# Patient Record
Sex: FEMALE | Race: BLACK OR AFRICAN AMERICAN | NOT HISPANIC OR LATINO | Employment: UNEMPLOYED | RURAL
[De-identification: names, ages, dates, MRNs, and addresses within clinical notes are randomized per-mention and may not be internally consistent; named-entity substitution may affect disease eponyms.]

---

## 2020-04-27 ENCOUNTER — HISTORICAL (OUTPATIENT)
Dept: ADMINISTRATIVE | Facility: HOSPITAL | Age: 40
End: 2020-04-27

## 2021-09-26 ENCOUNTER — HOSPITAL ENCOUNTER (EMERGENCY)
Facility: HOSPITAL | Age: 41
Discharge: HOME | End: 2021-09-26
Attending: FAMILY MEDICINE
Payer: MEDICAID

## 2021-09-26 VITALS
TEMPERATURE: 99 F | SYSTOLIC BLOOD PRESSURE: 117 MMHG | HEART RATE: 90 BPM | RESPIRATION RATE: 20 BRPM | BODY MASS INDEX: 30.13 KG/M2 | DIASTOLIC BLOOD PRESSURE: 71 MMHG | OXYGEN SATURATION: 99 % | HEIGHT: 67 IN | WEIGHT: 192 LBS

## 2021-09-26 DIAGNOSIS — M94.0 ACUTE COSTOCHONDRITIS: Primary | ICD-10-CM

## 2021-09-26 DIAGNOSIS — R07.9 CHEST PAIN: ICD-10-CM

## 2021-09-26 LAB
ALBUMIN SERPL BCP-MCNC: 3.1 G/DL (ref 3.5–5)
ALBUMIN/GLOB SERPL: 0.8 {RATIO}
ALP SERPL-CCNC: 111 U/L (ref 37–98)
ALT SERPL W P-5'-P-CCNC: 24 U/L (ref 13–56)
ANION GAP SERPL CALCULATED.3IONS-SCNC: 10 MMOL/L (ref 7–16)
APTT PPP: 31.2 SECONDS (ref 25.2–37.3)
AST SERPL W P-5'-P-CCNC: 20 U/L (ref 15–37)
BASOPHILS # BLD AUTO: 0.02 K/UL (ref 0–0.2)
BASOPHILS NFR BLD AUTO: 0.3 % (ref 0–1)
BILIRUB SERPL-MCNC: 0.3 MG/DL (ref 0–1.2)
BILIRUB UR QL STRIP: NEGATIVE
BUN SERPL-MCNC: 14 MG/DL (ref 7–18)
BUN/CREAT SERPL: 14 (ref 6–20)
CALCIUM SERPL-MCNC: 8.4 MG/DL (ref 8.5–10.1)
CHLORIDE SERPL-SCNC: 106 MMOL/L (ref 98–107)
CLARITY UR: CLEAR
CO2 SERPL-SCNC: 29 MMOL/L (ref 21–32)
COLOR UR: YELLOW
CREAT SERPL-MCNC: 1.01 MG/DL (ref 0.55–1.02)
DIFFERENTIAL METHOD BLD: ABNORMAL
EOSINOPHIL # BLD AUTO: 0.24 K/UL (ref 0–0.5)
EOSINOPHIL NFR BLD AUTO: 3.1 % (ref 1–4)
ERYTHROCYTE [DISTWIDTH] IN BLOOD BY AUTOMATED COUNT: 15 % (ref 11.5–14.5)
GLOBULIN SER-MCNC: 3.9 G/DL (ref 2–4)
GLUCOSE SERPL-MCNC: 101 MG/DL (ref 74–106)
GLUCOSE UR STRIP-MCNC: NEGATIVE MG/DL
HCG UR QL IA.RAPID: NEGATIVE
HCT VFR BLD AUTO: 38.7 % (ref 38–47)
HGB BLD-MCNC: 12.7 G/DL (ref 12–16)
IMM GRANULOCYTES # BLD AUTO: 0.02 K/UL (ref 0–0.04)
IMM GRANULOCYTES NFR BLD: 0.3 % (ref 0–0.4)
INR BLD: 0.83 (ref 0.9–1.1)
KETONES UR STRIP-SCNC: ABNORMAL MG/DL
LEUKOCYTE ESTERASE UR QL STRIP: NEGATIVE
LYMPHOCYTES # BLD AUTO: 3.99 K/UL (ref 1–4.8)
LYMPHOCYTES NFR BLD AUTO: 51 % (ref 27–41)
MAGNESIUM SERPL-MCNC: 1.6 MG/DL (ref 1.7–2.3)
MCH RBC QN AUTO: 28.7 PG (ref 27–31)
MCHC RBC AUTO-ENTMCNC: 32.8 G/DL (ref 32–36)
MCV RBC AUTO: 87.6 FL (ref 80–96)
MONOCYTES # BLD AUTO: 0.56 K/UL (ref 0–0.8)
MONOCYTES NFR BLD AUTO: 7.2 % (ref 2–6)
MPC BLD CALC-MCNC: 11.2 FL (ref 9.4–12.4)
NEUTROPHILS # BLD AUTO: 2.99 K/UL (ref 1.8–7.7)
NEUTROPHILS NFR BLD AUTO: 38.1 % (ref 53–65)
NITRITE UR QL STRIP: NEGATIVE
NRBC # BLD AUTO: 0 X10E3/UL
NRBC, AUTO (.00): 0 %
PH UR STRIP: 5.5 PH UNITS
PLATELET # BLD AUTO: 207 K/UL (ref 150–400)
POTASSIUM SERPL-SCNC: 3.7 MMOL/L (ref 3.5–5.1)
PROT SERPL-MCNC: 7 G/DL (ref 6.4–8.2)
PROT UR QL STRIP: NEGATIVE
PROTHROMBIN TIME: 11.5 SECONDS (ref 11.7–14.7)
RBC # BLD AUTO: 4.42 M/UL (ref 4.2–5.4)
RBC # UR STRIP: NEGATIVE /UL
SODIUM SERPL-SCNC: 141 MMOL/L (ref 136–145)
SP GR UR STRIP: >=1.03
TROPONIN I SERPL-MCNC: <0.017 NG/ML
UROBILINOGEN UR STRIP-ACNC: 0.2 MG/DL
WBC # BLD AUTO: 7.82 K/UL (ref 4.5–11)

## 2021-09-26 PROCEDURE — 85610 PROTHROMBIN TIME: CPT | Performed by: NURSE PRACTITIONER

## 2021-09-26 PROCEDURE — 25000003 PHARM REV CODE 250

## 2021-09-26 PROCEDURE — 85025 COMPLETE CBC W/AUTO DIFF WBC: CPT | Performed by: NURSE PRACTITIONER

## 2021-09-26 PROCEDURE — 84484 ASSAY OF TROPONIN QUANT: CPT | Performed by: NURSE PRACTITIONER

## 2021-09-26 PROCEDURE — 85730 THROMBOPLASTIN TIME PARTIAL: CPT | Performed by: NURSE PRACTITIONER

## 2021-09-26 PROCEDURE — 93005 ELECTROCARDIOGRAM TRACING: CPT

## 2021-09-26 PROCEDURE — 93010 EKG 12-LEAD: ICD-10-PCS | Mod: ,,, | Performed by: INTERNAL MEDICINE

## 2021-09-26 PROCEDURE — 99283 PR EMERGENCY DEPT VISIT,LEVEL III: ICD-10-PCS | Mod: ,,, | Performed by: FAMILY MEDICINE

## 2021-09-26 PROCEDURE — 80053 COMPREHEN METABOLIC PANEL: CPT | Performed by: NURSE PRACTITIONER

## 2021-09-26 PROCEDURE — 81025 URINE PREGNANCY TEST: CPT | Performed by: FAMILY MEDICINE

## 2021-09-26 PROCEDURE — 63600175 PHARM REV CODE 636 W HCPCS: Performed by: FAMILY MEDICINE

## 2021-09-26 PROCEDURE — 96374 THER/PROPH/DIAG INJ IV PUSH: CPT

## 2021-09-26 PROCEDURE — 36415 COLL VENOUS BLD VENIPUNCTURE: CPT | Performed by: NURSE PRACTITIONER

## 2021-09-26 PROCEDURE — 93010 ELECTROCARDIOGRAM REPORT: CPT | Mod: ,,, | Performed by: INTERNAL MEDICINE

## 2021-09-26 PROCEDURE — 99285 EMERGENCY DEPT VISIT HI MDM: CPT | Mod: 25

## 2021-09-26 PROCEDURE — 99283 EMERGENCY DEPT VISIT LOW MDM: CPT | Mod: ,,, | Performed by: FAMILY MEDICINE

## 2021-09-26 PROCEDURE — 83735 ASSAY OF MAGNESIUM: CPT | Performed by: NURSE PRACTITIONER

## 2021-09-26 PROCEDURE — 81003 URINALYSIS AUTO W/O SCOPE: CPT | Performed by: NURSE PRACTITIONER

## 2021-09-26 RX ORDER — DEXTROAMPHETAMINE SACCHARATE, AMPHETAMINE ASPARTATE, DEXTROAMPHETAMINE SULFATE AND AMPHETAMINE SULFATE 3.125; 3.125; 3.125; 3.125 MG/1; MG/1; MG/1; MG/1
20 TABLET ORAL 2 TIMES DAILY
COMMUNITY
End: 2023-06-24 | Stop reason: SDUPTHER

## 2021-09-26 RX ORDER — KETOROLAC TROMETHAMINE 15 MG/ML
15 INJECTION, SOLUTION INTRAMUSCULAR; INTRAVENOUS
Status: COMPLETED | OUTPATIENT
Start: 2021-09-26 | End: 2021-09-26

## 2021-09-26 RX ORDER — ASPIRIN 325 MG
TABLET ORAL
Status: COMPLETED
Start: 2021-09-26 | End: 2021-09-26

## 2021-09-26 RX ORDER — ASPIRIN 325 MG
325 TABLET ORAL DAILY
Status: DISCONTINUED | OUTPATIENT
Start: 2021-09-27 | End: 2021-09-26 | Stop reason: HOSPADM

## 2021-09-26 RX ORDER — BUPROPION HYDROCHLORIDE 100 MG/1
100 TABLET ORAL 2 TIMES DAILY
COMMUNITY

## 2021-09-26 RX ORDER — MELOXICAM 7.5 MG/1
7.5 TABLET ORAL DAILY
Qty: 14 TABLET | Refills: 0 | Status: SHIPPED | OUTPATIENT
Start: 2021-09-26

## 2021-09-26 RX ORDER — CITALOPRAM 40 MG/1
40 TABLET, FILM COATED ORAL DAILY
COMMUNITY
End: 2023-06-24 | Stop reason: SDUPTHER

## 2021-09-26 RX ADMIN — Medication 325 MG: at 07:09

## 2021-09-26 RX ADMIN — KETOROLAC TROMETHAMINE 15 MG: 15 INJECTION, SOLUTION INTRAMUSCULAR; INTRAVENOUS at 08:09

## 2021-09-26 RX ADMIN — ASPIRIN 325 MG ORAL TABLET 325 MG: 325 PILL ORAL at 07:09

## 2022-08-02 DIAGNOSIS — M75.122 COMPLETE ROTATOR CUFF TEAR OR RUPTURE OF LEFT SHOULDER, NOT SPECIFIED AS TRAUMATIC: ICD-10-CM

## 2022-08-02 DIAGNOSIS — S46.012A STRAIN OF TENDON OF LEFT ROTATOR CUFF: Primary | ICD-10-CM

## 2022-08-08 ENCOUNTER — CLINICAL SUPPORT (OUTPATIENT)
Dept: REHABILITATION | Facility: HOSPITAL | Age: 42
End: 2022-08-08
Payer: MEDICAID

## 2022-08-08 DIAGNOSIS — M25.512 PAIN AND SWELLING OF LEFT SHOULDER: ICD-10-CM

## 2022-08-08 DIAGNOSIS — M75.122 COMPLETE ROTATOR CUFF TEAR OR RUPTURE OF LEFT SHOULDER, NOT SPECIFIED AS TRAUMATIC: ICD-10-CM

## 2022-08-08 DIAGNOSIS — M25.612 DECREASED RANGE OF MOTION OF LEFT SHOULDER: ICD-10-CM

## 2022-08-08 DIAGNOSIS — S46.012A STRAIN OF TENDON OF LEFT ROTATOR CUFF: ICD-10-CM

## 2022-08-08 DIAGNOSIS — M75.122 NONTRAUMATIC COMPLETE TEAR OF LEFT ROTATOR CUFF: Primary | ICD-10-CM

## 2022-08-08 DIAGNOSIS — R29.898 WEAKNESS OF LEFT ARM: ICD-10-CM

## 2022-08-08 DIAGNOSIS — M25.412 PAIN AND SWELLING OF LEFT SHOULDER: ICD-10-CM

## 2022-08-08 PROCEDURE — 97110 THERAPEUTIC EXERCISES: CPT

## 2022-08-08 PROCEDURE — 97161 PT EVAL LOW COMPLEX 20 MIN: CPT

## 2022-08-08 NOTE — PLAN OF CARE
[facility]   Physical Therapy Initial Evaluation    Date: 8/8/2022   Name: Tess Capone  Clinic Number: [id number]    Therapy Diagnosis:   Encounter Diagnoses   Name Primary?    Strain of tendon of left rotator cuff     Complete rotator cuff tear or rupture of left shoulder, not specified as traumatic      Physician: Casi David PA-C    Physician Orders: PT Eval and Treat   Medical Diagnosis from Referral: Left RCR  Evaluation Date: 8/8/2022  Updated Plan of Care Due : 9/7/2022  Authorization Period Expiration: 8/2/2023  Plan of Care Expiration: 11/4/2022  Visit # / Visits authorized: 1/ Unlimited     Time In: 11:00 am   Time Out: 11:55 am   Total Appointment Time (timed & untimed codes): 55 minutes    Precautions: PROM at this time    Subjective   Date of onset: 7/25/2022  History of current condition - Tess reports: She had wear and tear on the Left Shoulder causing pain. She saw the MD and was found to have a complete rotator cuff tear requiring surgery. She underwent surgery on 7/25/2022- 2 weeks ago today. She is here today in sling and abduction wedge. She returns to see the MD on 9/12/2022.     Had surgery at [facility] in [place], [place]     Medical History:   Past Medical History:   Diagnosis Date    ADHD     Bipolar 1 disorder     Depression     HTN (hypertension)     Hyperlipidemia        Surgical History:   Tess Capone  has no past surgical history on file.    Medications:   Tess has a current medication list which includes the following prescription(s): bupropion, citalopram, dextroamphetamine-amphetamine, and meloxicam.    Allergies:   Review of patient's allergies indicates:   Allergen Reactions    Latex, natural rubber         Imaging : RCR    Prior Therapy: None for this   Social History:  lives with their spouse  Occupation: Disabled   Prior Level of Function: Able to perform all ADLs and Activities Independently   Current Level of Function: Unable to  "use Left Upper Extremity at this time    Pain:  Current 7/10, worst 10/10, best 7/10   Location: left shoulder    Description: Aching, Dull, Throbbing, Sharp and Shooting  Aggravating Factors: Moving the Left arm  Easing Factors: pain medication, ice and heating pad    Patients goals: "I want to be able to use my arm."    Objective     Observation : Here today with Sling and Abduction Wedge    Forward Head/Rounded Shoulders :  [x] Yes   [] No   Scapular Winging :  [] Yes   [x] No   Incision :    Healing well       Range of Motion/Strength :                  Left Extremity                                                                        Right Extremity     AROM   PROM  Strength                  Location   AROM    PROM   Strength      85  NT   Shoulder    Flexion  Normal    5/5      75                       Abduction                                Scaption        20                       ER in Adduction        To Waist                       IR in Adduction                              Functional IR                              ER in 90 Scaption                              IR in 90 Scaption       100  NT   Elbow         Flexion  Normal    5/5     -10                       Extension                              Pron/Supination          Functional Impairments : Unable to use the Left Upper Extremity at all at this time    Ligamentous Stability : Intact     Rotator Cuff : Repaired     Labrum : Intact     Palpation :  Tender to palpation along the Upper Traps and Rotator Cuff as expected       Limitation/Restriction for FOTO Intake Shoulder Survey    Therapist reviewed FOTO scores for Tess Liborio on 8/8/2022.   FOTO documents entered into [employer] - see Media section.    Limitation Score: 68%         TREATMENT   Treatment Time In: 11:35 am   Treatment Time Out: 11:45 am   Total Treatment time (time-based codes) separate from Evaluation: 10 minutes    Tess received the treatments listed below:  THERAPEUTIC " EXERCISES to develop ROM for 10 minutes including :  Initiated a Home Exercise Program to include Scap Ret, Pendulums, and Table Slides       Home Exercises and Patient Education Provided    Education provided:   - Discussed the findings from the Evaluation, Reviewed the Plan of Care, and Instructed patient on their Home Exercise Program     Written Home Exercises Provided: yes.  Exercises were reviewed and Tess was able to demonstrate them prior to the end of the session.  Tess demonstrated good  understanding of the education provided.     See EMR under Patient Instructions for exercises provided 8/8/2022.    Assessment   Tess is a 42 y.o. female referred to outpatient Physical Therapy with a medical diagnosis of Left RCR. Patient underwent RCR on 7/27/2022. She is here today for her Outpatient Physical Therapy Evaluation in an Abduction Wedge and Sling. She is to be Passive Range of Motion at this time. The therapist initiated a Home Exercise Program of Scap Ret, Pendulums, and Table Slides today. Therapist will progress patient per her MD protocol. Patient will require Physical Therapy intervention to address all deficits and work toward completion of all goals set.     Patient prognosis is Good.   Patientt will benefit from skilled outpatient Physical Therapy to address the deficits stated above and in the chart below, provide patient /family education, and to maximize patientt's level of independence.     Plan of care discussed with patient: Yes  Patient's spiritual, cultural and educational needs considered and patient is agreeable to the plan of care and goals as stated below:     Anticipated Barriers for therapy: None     Goals :   Short Term Goals : 4 weeks   1. Independent with Home Exercise Program   2. Increase Left Shoulder Flexion and Abduction Passive Range of Motion to 120 degrees   3. Increase Left Shoulder Passive Internal Rotation and External Rotation Range of Motion to 60 degrees    4. Increase Left Shoulder Strength to 3/5   5. Decrease complaints of Left Shoulder Pain to 4/10 with Activity     Long Term Goals : 12 weeks   1.Patient will perform 30 minutes of Activity with use of Left Shoulder with Pain Less than or Equal to 3/10  2. Increase Active Range of Motion to Within Normal Limits   3. Increase Left Shoulder and Scapular Strength to 4/5      Plan   Plan of care Certification: 8/8/2022 to 11/4/2022.    Outpatient Physical Therapy 2 times weekly for 12 weeks to include the following interventions: Electrical Stimulation to decrease pain and inflammation as needed, Manual Therapy, Moist Heat/ Ice, Neuromuscular Re-ed, Patient Education and Therapeutic Exercise.     SUJEY LLAMAS, PT, DPT       I CERTIFY THE NEED FOR THESE SERVICES FURNISHED UNDER THIS PLAN OF TREATMENT AND WHILE UNDER MY CARE.    Physician's comments:      Physician's Signature: ___________________________________________________

## 2022-08-08 NOTE — PROGRESS NOTES
See Plan of Care     Sup Visit performed today with SAMIRA Melchor and SAMIRA Gonzalez.  All goals and treatment plan reviewed. Will work toward completion of all goals set.    She is Passive Range of Motion at this time     First Treatment May Include :     Shoulder Exercises    UBE    Pulleys        Pendulums - Forward/Back    Pendulums - Side to Side    Pendulums - Circles        Prone - Row's     Prone - W's    Prone - T's    Prone - Shoulder Ext/Scap Retraction        Table Slides                   Shoulder Manual     PROM with End Range Stretch    Capsular Mobilizations     Scapular PNF    Deep Tissue/Myofascial            Shoulder Modalities          ---------------------------------------------------------------------------------------------------------------------------------------------------------------------------------      Medium Rotator Cuff Repair (1-3cm)                                                                                             Post-operative Protocol     Ultra sling for weeks 0-3                     Regular sling for weeks 3-6                     If biceps tenodesis is also performed, then all ROM for weeks 0-6 performed w/ elbow flexed        Phase 0                                  Pendulums to warm-up beginning week 1                                                  Scapular stabilizations                                                    Phase 1 - (PASSIVE)                          Week 2-6         Supine External Rotation -0°-30° beginning at 2 weeks with progression to full PROM by 6 weeks               Supine Forward Elevation -0°-90° beginning at 2 weeks with progression to full PROM by 6 weeks               *progress to upright as tolerated with ER and FE        Phase 2 - (ACTIVE)               Pendulums to warm-up.  Week 7-9                                 Active Range of Motion with terminal stretch               Supine External Rotation - after 6 weeks;  progress GRADUALLY to full              Supine Forward Elevation - after 6 weeks; progress GRADUALLY to full               Begin active biceps              Internal Rotation - Full (begin behind the back)              Begin AROM in supine and progress to upright     Phase 3 - (RESISTED)          Pendulums to warm up and continue with phase 2.   Week 10          External and Internal Rotation               Standing forward punch                        Seated rows                  Shoulder shrugs and Biceps curls         Weight Training                     Keep hands within eyesight, keep elbows bent, no long lever arms.  Week 12                                  Minimize overhead activities (below shoulder)                                                  (No  press, pull-down behind head, or wide  bench)        Initiation of Interval Sport Programs     Golf                              3 months  Tennis                         4 months  Ski                               3-4 months                                                               Return to Activity   Rotator Cuff Repair        Slin weeks post-operative  Showering: After 3 days, no soaking     · Passive Range of Motion: Post-op day 1 until 6 weeks     · Active Range of Motion: Weeks 6 to 10     · Resistive exercise program: Approximately week 10      · Running: Week 10     1. Gym Program:    ? Pulling motions, biceps, and triceps exercises week 16, using light weights and high repetitions  ? Pressing motions at 16 to 20 weeks as tolerated     · Outdoor Bikin-14 weeks on road bike                               1. Interval Sports Programs: Week 16 to 20  ? Throwing  ? Swimming  ? Golf  ? Tennis     · Return to Competition: Week 20-24, based on physician clinical examination and >90% strength testing

## 2022-08-11 ENCOUNTER — CLINICAL SUPPORT (OUTPATIENT)
Dept: REHABILITATION | Facility: HOSPITAL | Age: 42
End: 2022-08-11
Payer: MEDICAID

## 2022-08-11 DIAGNOSIS — M75.122 NONTRAUMATIC COMPLETE TEAR OF LEFT ROTATOR CUFF: Primary | ICD-10-CM

## 2022-08-11 DIAGNOSIS — M25.412 PAIN AND SWELLING OF LEFT SHOULDER: ICD-10-CM

## 2022-08-11 DIAGNOSIS — R29.898 WEAKNESS OF LEFT ARM: ICD-10-CM

## 2022-08-11 DIAGNOSIS — M25.512 PAIN AND SWELLING OF LEFT SHOULDER: ICD-10-CM

## 2022-08-11 DIAGNOSIS — M25.612 DECREASED RANGE OF MOTION OF LEFT SHOULDER: ICD-10-CM

## 2022-08-11 PROCEDURE — 97140 MANUAL THERAPY 1/> REGIONS: CPT | Mod: CQ

## 2022-08-11 PROCEDURE — 97110 THERAPEUTIC EXERCISES: CPT | Mod: CQ

## 2022-08-11 NOTE — PROGRESS NOTES
[facility] Treatment Note     Name: Tess Capone  Clinic Number: [id number]    Visit Date: 8/11/2022  Therapy Diagnosis:        Encounter Diagnoses   Name Primary?    Strain of tendon of left rotator cuff      Complete rotator cuff tear or rupture of left shoulder, not specified as traumatic        Physician: Casi David PA-C     Physician Orders: PT Eval and Treat   Medical Diagnosis from Referral: Left RCR  Evaluation Date: 8/8/2022  Updated Plan of Care Due : 9/7/2022  Authorization Period Expiration: 8/2/2023  Plan of Care Expiration: 11/4/2022    Visit # / Visits authorized: 2 / Unlimited   PTA Visit #: 1/5    Time In: 10:44 am  Time Out: 11:12  Total Billable Time: 28 minutes    Precautions: Standard    Prior Level of Function: Able to perform all ADLs and Activities Independently   Current Level of Function: Unable to use Left Upper Extremity at this time         Subjective     Pt reports: 5/10 pain but has taken her medicine this AM.  She was compliant with home exercise program.    Pain: 5/10  Location: left shoulder      Objective       Tess received therapeutic exercises to develop strength, endurance and ROM for 15 minutes including:       UBE     Pulleys           Pendulums - Forward/Back     Pendulums - Side to Side     Pendulums - Circles           Prone - Row's      Prone - W's     Prone - T's     Prone - Shoulder Ext/Scap Retraction           Table Slides   FF/Abd 15x5   Wrist 4-way  2# 30x   Gripping 2 mins         Tess received the following manual therapy techniques: Joint mobilizations, Manual traction and Myofacial release were applied to the: shoulder for 13 minutes, including:       PROM with End Range Stretch  X   Capsular Mobilizations   X   Scapular PNF     Deep Tissue/Myofascial                 Tess participated in neuromuscular re-education activities to improve: Balance, Coordination, Kinesthetic and Proprioception for 0 minutes. The following  activities were included:        Home Exercises Provided and Patient Education Provided     Education provided: AVS    Written Home Exercises Provided: Patient instructed to cont prior HEP.  Exercises were reviewed and Tess was able to demonstrate them prior to the end of the session.  Tess demonstrated good  understanding of the education provided.     See EMR under Patient Instructions for exercises provided prior visit.    Assessment     Pt tolerated all therapeutic exercises today with min. C/o pain noted. ROM is doing well for this stage of rehab. Educated pt to continue with her established HEP diligently.     P.M.H:  Tess is a 42 y.o. female referred to outpatient Physical Therapy with a medical diagnosis of Left RCR. Patient underwent RCR on 7/27/2022. She is here today for her Outpatient Physical Therapy Evaluation in an Abduction Wedge and Sling. She is to be Passive Range of Motion at this time. The therapist initiated a Home Exercise Program of Scap Ret, Pendulums, and Table Slides today. Therapist will progress patient per her MD protocol. Patient will require Physical Therapy intervention to address all deficits and work toward completion of all goals set.        Tess Is progressing well towards her goals.   Pt prognosis is Good.     Pt will continue to benefit from skilled outpatient physical therapy to address the deficits listed in the problem list box on initial evaluation, provide pt/family education and to maximize pt's level of independence in the home and community environment.     Pt's spiritual, cultural and educational needs considered and pt agreeable to plan of care and goals.     Anticipated barriers to physical therapy: None    Goals :   Short Term Goals : 4 weeks   1. Independent with Home Exercise Program   2. Increase Left Shoulder Flexion and Abduction Passive Range of Motion to 120 degrees   3. Increase Left Shoulder Passive Internal Rotation and External Rotation  Range of Motion to 60 degrees   4. Increase Left Shoulder Strength to 3/5   5. Decrease complaints of Left Shoulder Pain to 4/10 with Activity      Long Term Goals : 12 weeks   1.Patient will perform 30 minutes of Activity with use of Left Shoulder with Pain Less than or Equal to 3/10  2. Increase Active Range of Motion to Within Normal Limits   3. Increase Left Shoulder and Scapular Strength to 4/5      Plan     Plan of care Certification: 8/8/2022 to 11/4/2022.     Outpatient Physical Therapy 2 times weekly for 12 weeks to include the following interventions: Electrical Stimulation to decrease pain and inflammation as needed, Manual Therapy, Moist Heat/ Ice, Neuromuscular Re-ed, Patient Education and Therapeutic Exercise.       Vinod Dawkins, PTA  8/11/2022

## 2022-08-16 ENCOUNTER — CLINICAL SUPPORT (OUTPATIENT)
Dept: REHABILITATION | Facility: HOSPITAL | Age: 42
End: 2022-08-16
Payer: MEDICAID

## 2022-08-16 DIAGNOSIS — M75.122 NONTRAUMATIC COMPLETE TEAR OF LEFT ROTATOR CUFF: Primary | ICD-10-CM

## 2022-08-16 DIAGNOSIS — M25.612 DECREASED RANGE OF MOTION OF LEFT SHOULDER: ICD-10-CM

## 2022-08-16 DIAGNOSIS — M25.512 PAIN AND SWELLING OF LEFT SHOULDER: ICD-10-CM

## 2022-08-16 DIAGNOSIS — R29.898 WEAKNESS OF LEFT ARM: ICD-10-CM

## 2022-08-16 DIAGNOSIS — M25.412 PAIN AND SWELLING OF LEFT SHOULDER: ICD-10-CM

## 2022-08-16 PROCEDURE — 97110 THERAPEUTIC EXERCISES: CPT | Mod: CQ

## 2022-08-16 PROCEDURE — 97140 MANUAL THERAPY 1/> REGIONS: CPT | Mod: CQ

## 2022-08-16 NOTE — PROGRESS NOTES
[facility] Treatment Note     Name: Tess Capone  Clinic Number: [id number]    Visit Date: 8/16/2022  Therapy Diagnosis:        Encounter Diagnoses   Name Primary?    Strain of tendon of left rotator cuff      Complete rotator cuff tear or rupture of left shoulder, not specified as traumatic        Physician: Casi David PA-C     Physician Orders: PT Eval and Treat   Medical Diagnosis from Referral: Left RCR  Evaluation Date: 8/8/2022  Updated Plan of Care Due : 9/7/2022  Authorization Period Expiration: 8/2/2023  Plan of Care Expiration: 11/4/2022    Visit # / Visits authorized: 3 / Unlimited   PTA Visit #: 2/5    Time In: 9:58 am  Time Out: 11:12  Total Billable Time: 28 minutes    Precautions: Standard    Prior Level of Function: Able to perform all ADLs and Activities Independently   Current Level of Function: Unable to use Left Upper Extremity at this time         Subjective     Pt reports: 7/10 pain due to over-working her HEP. No pain medicine taken this AM.  She was compliant with home exercise program.    Pain: 5/10  Location: left shoulder      Objective       Tess received therapeutic exercises to develop strength, endurance and ROM for 15 minutes including:       UBE     Pulleys           Pendulums - Forward/Back     Pendulums - Side to Side     Pendulums - Circles           Prone - Row's      Prone - W's     Prone - T's     Prone - Shoulder Ext/Scap Retraction      Pronate/Supinate  30x ea   Table Slides   FF/Abd 15x5   Wrist 4-way  2# 30x   Gripping  2 mins         Tess received the following manual therapy techniques: Joint mobilizations, Manual traction and Myofacial release were applied to the: shoulder for 15 minutes, including:       PROM with End Range Stretch  X   Capsular Mobilizations   X   Scapular PNF     Deep Tissue/Myofascial                 Tess participated in neuromuscular re-education activities to improve: Balance, Coordination, Kinesthetic and  Proprioception for 0 minutes. The following activities were included:        Home Exercises Provided and Patient Education Provided     Education provided: AVS    Written Home Exercises Provided: Patient instructed to cont prior HEP.  Exercises were reviewed and Tess was able to demonstrate them prior to the end of the session.  Tess demonstrated good  understanding of the education provided.     See EMR under Patient Instructions for exercises provided prior visit.    Assessment     Pt tolerated all therapeutic exercises and manual ROM today with mild c/o pain. ROM is doing well and improving with firm end feel at end ranges. Pt demonstrates ability to perform HEP with no cues. Educated pt to keep diligently working on her HEP. Will progress per protocol.     P.M.H:  Tess is a 42 y.o. female referred to outpatient Physical Therapy with a medical diagnosis of Left RCR. Patient underwent RCR on 7/27/2022. She is here today for her Outpatient Physical Therapy Evaluation in an Abduction Wedge and Sling. She is to be Passive Range of Motion at this time. The therapist initiated a Home Exercise Program of Scap Ret, Pendulums, and Table Slides today. Therapist will progress patient per her MD protocol. Patient will require Physical Therapy intervention to address all deficits and work toward completion of all goals set.        Tess Is progressing well towards her goals.   Pt prognosis is Good.     Pt will continue to benefit from skilled outpatient physical therapy to address the deficits listed in the problem list box on initial evaluation, provide pt/family education and to maximize pt's level of independence in the home and community environment.     Pt's spiritual, cultural and educational needs considered and pt agreeable to plan of care and goals.     Anticipated barriers to physical therapy: None    Goals :   Short Term Goals : 4 weeks   1. Independent with Home Exercise Program   2. Increase Left  Shoulder Flexion and Abduction Passive Range of Motion to 120 degrees   3. Increase Left Shoulder Passive Internal Rotation and External Rotation Range of Motion to 60 degrees   4. Increase Left Shoulder Strength to 3/5   5. Decrease complaints of Left Shoulder Pain to 4/10 with Activity      Long Term Goals : 12 weeks   1.Patient will perform 30 minutes of Activity with use of Left Shoulder with Pain Less than or Equal to 3/10  2. Increase Active Range of Motion to Within Normal Limits   3. Increase Left Shoulder and Scapular Strength to 4/5      Plan     Plan of care Certification: 8/8/2022 to 11/4/2022.     Outpatient Physical Therapy 2 times weekly for 12 weeks to include the following interventions: Electrical Stimulation to decrease pain and inflammation as needed, Manual Therapy, Moist Heat/ Ice, Neuromuscular Re-ed, Patient Education and Therapeutic Exercise.       Vinod Dawkins, OLE  8/16/2022

## 2022-08-19 ENCOUNTER — CLINICAL SUPPORT (OUTPATIENT)
Dept: REHABILITATION | Facility: HOSPITAL | Age: 42
End: 2022-08-19
Payer: MEDICAID

## 2022-08-19 DIAGNOSIS — M25.412 PAIN AND SWELLING OF LEFT SHOULDER: ICD-10-CM

## 2022-08-19 DIAGNOSIS — R29.898 WEAKNESS OF LEFT ARM: ICD-10-CM

## 2022-08-19 DIAGNOSIS — M75.122 NONTRAUMATIC COMPLETE TEAR OF LEFT ROTATOR CUFF: Primary | ICD-10-CM

## 2022-08-19 DIAGNOSIS — M25.612 DECREASED RANGE OF MOTION OF LEFT SHOULDER: ICD-10-CM

## 2022-08-19 DIAGNOSIS — M25.512 PAIN AND SWELLING OF LEFT SHOULDER: ICD-10-CM

## 2022-08-19 PROCEDURE — 97140 MANUAL THERAPY 1/> REGIONS: CPT

## 2022-08-19 PROCEDURE — 97110 THERAPEUTIC EXERCISES: CPT

## 2022-08-19 PROCEDURE — 97014 ELECTRIC STIMULATION THERAPY: CPT

## 2022-08-19 NOTE — PROGRESS NOTES
[facility] Treatment Note     Name: Tess Liborio  Clinic Number: [id number]    Visit Date: 8/19/2022  Therapy Diagnosis:        Encounter Diagnoses   Name Primary?    Strain of tendon of left rotator cuff      Complete rotator cuff tear or rupture of left shoulder, not specified as traumatic        Physician: Casi David PA-C     Physician Orders: PT Eval and Treat   Medical Diagnosis from Referral: Left RCR  Evaluation Date: 8/8/2022  Date of Surgery : 7/25/2022  Updated Plan of Care Due : 9/7/2022  Authorization Period Expiration: 8/2/2023  Plan of Care Expiration: 11/4/2022    Visit # / Visits authorized: 3 / Unlimited   PTA Visit #: 2/5    Time In: 10:42 am  Time Out: 11:48 am   Total Billable Time: 60 minutes    Precautions: Standard    Prior Level of Function: Able to perform all ADLs and Activities Independently   Current Level of Function: Unable to use Left Upper Extremity at this time         Subjective     Pt reports: She has taken pain meds today but her pain is still 8/10  She was compliant with home exercise program.    Pain: 8/10  Location: left shoulder      Objective       Tess received therapeutic exercises to develop strength, endurance and ROM for 30 minutes including:       UBE  6 Min    Pulleys  6 Min          Pendulums - Forward/Back  At Home    Pendulums - Side to Side  At Home    Pendulums - Circles  At Home          Prone - Row's   x 10 (Added 8/19)   Prone - W's  x 10 (Added 8/19)   Prone - T's  x 10 (Added 8/19)   Prone - Shoulder Ext/Scap Retraction      Pronate/Supinate  30x ea with 2#   Table Slides   FF/Abd 15x5   Wrist 4-way  2# 30x   Gripping  2 mins         Tess received the following manual therapy techniques: Joint mobilizations, Manual traction and Myofacial release were applied to the: shoulder for 15 minutes, including:       PROM with End Range Stretch  X   Capsular Mobilizations   X   Scapular PNF     Deep Tissue/Myofascial                  Tess participated in neuromuscular re-education activities to improve: Balance, Coordination, Kinesthetic and Proprioception for 0 minutes. The following activities were included:      Patient received the following supervised modalities after being cleared for contradictions: Pre-Mod Electrical Stimulation:  Patient received Pre-Mod Electrical Stimulation for pain control applied to the Left Shoulder. Pt received stimulation at a frequency of  Hz for 15 minutes. Patient tolerated treatment well without any adverse effects.      Ice Pack x 15 Min       Home Exercises Provided and Patient Education Provided     Education provided: AVS    Written Home Exercises Provided: Patient instructed to cont prior HEP.  Exercises were reviewed and Tess was able to demonstrate them prior to the end of the session.  Tess demonstrated good  understanding of the education provided.     See EMR under Patient Instructions for exercises provided prior visit.    Assessment     Patient here today in sling and abduction wedge. Instructed patient that she can remove the abduction wedge at this time but is still to wear the sling. Patient verbalized understanding. Therapist instructed patient on prone scapular exercises today as listed above. Patient preferred to perform these exercises standing up while propping up on the plinth rather than laying in a prone position. She was able to perform the exercises well with verbal and tactile cues. Ended session with Pre-Mod and ice to Left Shoulder. Will continue to progress her slowly through the RCR protocol.     .     P.M.H:  Tess is a 42 y.o. female referred to outpatient Physical Therapy with a medical diagnosis of Left RCR. Patient underwent RCR on 7/27/2022. She is here today for her Outpatient Physical Therapy Evaluation in an Abduction Wedge and Sling. She is to be Passive Range of Motion at this time. The therapist initiated a Home Exercise Program of Scap  Ret, Pendulums, and Table Slides today. Therapist will progress patient per her MD protocol. Patient will require Physical Therapy intervention to address all deficits and work toward completion of all goals set.        Tess Is progressing well towards her goals.   Pt prognosis is Good.     Pt will continue to benefit from skilled outpatient physical therapy to address the deficits listed in the problem list box on initial evaluation, provide pt/family education and to maximize pt's level of independence in the home and community environment.     Pt's spiritual, cultural and educational needs considered and pt agreeable to plan of care and goals.     Anticipated barriers to physical therapy: None    Goals :   Short Term Goals : 4 weeks   1. Independent with Home Exercise Program   2. Increase Left Shoulder Flexion and Abduction Passive Range of Motion to 120 degrees   3. Increase Left Shoulder Passive Internal Rotation and External Rotation Range of Motion to 60 degrees   4. Increase Left Shoulder Strength to 3/5   5. Decrease complaints of Left Shoulder Pain to 4/10 with Activity      Long Term Goals : 12 weeks   1.Patient will perform 30 minutes of Activity with use of Left Shoulder with Pain Less than or Equal to 3/10  2. Increase Active Range of Motion to Within Normal Limits   3. Increase Left Shoulder and Scapular Strength to 4/5      Plan     Plan of care Certification: 8/8/2022 to 11/4/2022.     Outpatient Physical Therapy 2 times weekly for 12 weeks to include the following interventions: Electrical Stimulation to decrease pain and inflammation as needed, Manual Therapy, Moist Heat/ Ice, Neuromuscular Re-ed, Patient Education and Therapeutic Exercise.       SUJEY LLAMAS, PT, DPT   8/19/2022

## 2022-08-22 ENCOUNTER — CLINICAL SUPPORT (OUTPATIENT)
Dept: REHABILITATION | Facility: HOSPITAL | Age: 42
End: 2022-08-22
Payer: MEDICAID

## 2022-08-22 DIAGNOSIS — M75.122 NONTRAUMATIC COMPLETE TEAR OF LEFT ROTATOR CUFF: Primary | ICD-10-CM

## 2022-08-22 DIAGNOSIS — R29.898 WEAKNESS OF LEFT ARM: ICD-10-CM

## 2022-08-22 DIAGNOSIS — M25.512 PAIN AND SWELLING OF LEFT SHOULDER: ICD-10-CM

## 2022-08-22 DIAGNOSIS — M25.412 PAIN AND SWELLING OF LEFT SHOULDER: ICD-10-CM

## 2022-08-22 DIAGNOSIS — M25.612 DECREASED RANGE OF MOTION OF LEFT SHOULDER: ICD-10-CM

## 2022-08-22 PROCEDURE — 97110 THERAPEUTIC EXERCISES: CPT | Mod: CQ

## 2022-08-22 PROCEDURE — 97140 MANUAL THERAPY 1/> REGIONS: CPT | Mod: CQ

## 2022-08-22 NOTE — PROGRESS NOTES
[facility] Treatment Note     Name: Tess Liborio  Clinic Number: [id number]    Visit Date: 8/22/2022  Therapy Diagnosis:        Encounter Diagnoses   Name Primary?    Strain of tendon of left rotator cuff      Complete rotator cuff tear or rupture of left shoulder, not specified as traumatic        Physician: Casi David PA-C     Physician Orders: PT Eval and Treat   Medical Diagnosis from Referral: Left RCR  Evaluation Date: 8/8/2022  Date of Surgery : 7/25/2022  Updated Plan of Care Due : 9/7/2022  Authorization Period Expiration: 8/2/2023  Plan of Care Expiration: 11/4/2022    Visit # / Visits authorized: 4 / Unlimited   PTA Visit #: 1/5    Time In: 10:36 am  Time Out: 11:23 am   Total Billable Time: 47 minutes    Precautions: Standard    Prior Level of Function: Able to perform all ADLs and Activities Independently   Current Level of Function: Unable to use Left Upper Extremity at this time         Subjective     Pt reports: 5/10 pain today.   She was compliant with home exercise program.    Pain: 8/10  Location: left shoulder      Objective       Tess received therapeutic exercises to develop strength, endurance and ROM for 30 minutes including:       UBE  5 Min    Pulleys  5 Min    Cane IR/Flexion  20x   Pendulums - Forward/Back  At Home    Pendulums - Side to Side  At Home    Pendulums - Circles  At Home          Prone - Row's   x 15 (Added 8/19)   Prone - W's  x 15 (Added 8/19)   Prone - T's  x 15 (Added 8/19)   Prone - Shoulder Ext/Scap Retraction      Pronate/Supinate  30x ea with 2#   Table Slides   FF/Abd 15x5   Wrist 4-way  2# 30x   Gripping           Tess received the following manual therapy techniques: Joint mobilizations, Manual traction and Myofacial release were applied to the: shoulder for 15 minutes, including:       PROM with End Range Stretch  X   Capsular Mobilizations   X   Scapular PNF     Deep Tissue/Myofascial                 Tess participated in  neuromuscular re-education activities to improve: Balance, Coordination, Kinesthetic and Proprioception for 0 minutes. The following activities were included:      Patient received the following supervised modalities after being cleared for contradictions: Pre-Mod Electrical Stimulation:  Patient received Pre-Mod Electrical Stimulation for pain control applied to the Left Shoulder. Pt received stimulation at a frequency of  Hz for 15 minutes. Patient tolerated treatment well without any adverse effects.      Ice Pack x 15 Min       Home Exercises Provided and Patient Education Provided     Education provided: AVS    Written Home Exercises Provided: Patient instructed to cont prior HEP.  Exercises were reviewed and Tess was able to demonstrate them prior to the end of the session.  Tess demonstrated good  understanding of the education provided.     See EMR under Patient Instructions for exercises provided prior visit.    Assessment     Pt tolerated all therapeutic exercises and manual ROM today with mild c/o pain/soreness. ROM is doing great. Progressed AAROM exercises and AROM exercises with fatigue noted. Educated pt to continue to be diligent with her HEP.       P.M.H:  Tses is a 42 y.o. female referred to outpatient Physical Therapy with a medical diagnosis of Left RCR. Patient underwent RCR on 7/27/2022. She is here today for her Outpatient Physical Therapy Evaluation in an Abduction Wedge and Sling. She is to be Passive Range of Motion at this time. The therapist initiated a Home Exercise Program of Scap Ret, Pendulums, and Table Slides today. Therapist will progress patient per her MD protocol. Patient will require Physical Therapy intervention to address all deficits and work toward completion of all goals set.        Tess Is progressing well towards her goals.   Pt prognosis is Good.     Pt will continue to benefit from skilled outpatient physical therapy to address the deficits listed  in the problem list box on initial evaluation, provide pt/family education and to maximize pt's level of independence in the home and community environment.     Pt's spiritual, cultural and educational needs considered and pt agreeable to plan of care and goals.     Anticipated barriers to physical therapy: None    Goals :   Short Term Goals : 4 weeks   1. Independent with Home Exercise Program   2. Increase Left Shoulder Flexion and Abduction Passive Range of Motion to 120 degrees   3. Increase Left Shoulder Passive Internal Rotation and External Rotation Range of Motion to 60 degrees   4. Increase Left Shoulder Strength to 3/5   5. Decrease complaints of Left Shoulder Pain to 4/10 with Activity      Long Term Goals : 12 weeks   1.Patient will perform 30 minutes of Activity with use of Left Shoulder with Pain Less than or Equal to 3/10  2. Increase Active Range of Motion to Within Normal Limits   3. Increase Left Shoulder and Scapular Strength to 4/5      Plan     Plan of care Certification: 8/8/2022 to 11/4/2022.     Outpatient Physical Therapy 2 times weekly for 12 weeks to include the following interventions: Electrical Stimulation to decrease pain and inflammation as needed, Manual Therapy, Moist Heat/ Ice, Neuromuscular Re-ed, Patient Education and Therapeutic Exercise.       Vinod Dawkins PTA,   8/22/2022

## 2022-08-29 ENCOUNTER — CLINICAL SUPPORT (OUTPATIENT)
Dept: REHABILITATION | Facility: HOSPITAL | Age: 42
End: 2022-08-29
Payer: MEDICAID

## 2022-08-29 DIAGNOSIS — M25.512 PAIN AND SWELLING OF LEFT SHOULDER: ICD-10-CM

## 2022-08-29 DIAGNOSIS — M25.412 PAIN AND SWELLING OF LEFT SHOULDER: ICD-10-CM

## 2022-08-29 DIAGNOSIS — R29.898 WEAKNESS OF LEFT ARM: ICD-10-CM

## 2022-08-29 DIAGNOSIS — M75.122 NONTRAUMATIC COMPLETE TEAR OF LEFT ROTATOR CUFF: Primary | ICD-10-CM

## 2022-08-29 DIAGNOSIS — M25.612 DECREASED RANGE OF MOTION OF LEFT SHOULDER: ICD-10-CM

## 2022-08-29 PROCEDURE — 97016 VASOPNEUMATIC DEVICE THERAPY: CPT | Mod: CQ

## 2022-08-29 PROCEDURE — 97110 THERAPEUTIC EXERCISES: CPT | Mod: CQ

## 2022-08-29 PROCEDURE — 97140 MANUAL THERAPY 1/> REGIONS: CPT | Mod: CQ

## 2022-08-29 NOTE — PROGRESS NOTES
[facility] Treatment Note     Name: Tess Capone  Clinic Number: [id number]    Visit Date: 8/29/2022  Therapy Diagnosis:            Encounter Diagnoses   Name Primary?    Strain of tendon of left rotator cuff      Complete rotator cuff tear or rupture of left shoulder, not specified as traumatic        Physician: Casi David PA-C     Physician Orders: PT Eval and Treat   Medical Diagnosis from Referral: Left RCR  Evaluation Date: 8/8/2022  Date of Surgery : 7/25/2022  Updated Plan of Care Due : 9/7/2022  Authorization Period Expiration: 8/2/2023  Plan of Care Expiration: 11/4/2022    Visit # / Visits authorized: 5 / Unlimited   PTA Visit #: 2/5    Time In: 10:03 am  Time Out: 10:59 am   Total Billable Time: 56 minutes    Precautions: Standard    Prior Level of Function: Able to perform all ADLs and Activities Independently   Current Level of Function: Unable to use Left Upper Extremity at this time         Subjective     Pt reports: sore due to doing her HEP this weekend  She was compliant with home exercise program.    Pain: 8/10  Location: left shoulder      Objective     Passive Shoulder Flexion: 145  Passive Shoulder Abduction: 140  Passive Shoulder IR: WFL  Passive Shoulder ER: NTV  Passive Shoulder Extension: NTV    Tess received therapeutic exercises to develop strength, endurance and ROM for 30 minutes including:       UBE  5 Min    Pulleys  5 Min    Cane IR/Flexion  20x   Supine Cane Flexion  20x       Supine SA Punches 3x10   Sidelying ER 3x10      Prone - Row's   x 20 (Added 8/19)   Prone - W's  x 20 (Added 8/19)   Prone - T's  x 20 (Added 8/19)   Prone - Shoulder Ext/Scap Retraction                 Gripping           Tess received the following manual therapy techniques: Joint mobilizations, Manual traction and Myofacial release were applied to the: shoulder for 10 minutes, including:       PROM with End Range Stretch  X   Capsular Mobilizations   X   Scapular PNF     Deep  Tissue/Myofascial                 Tess participated in neuromuscular re-education activities to improve: Balance, Coordination, Kinesthetic and Proprioception for 0 minutes. The following activities were included:      Patient received the following supervised modalities after being cleared for contradictions: Pre-Mod Electrical Stimulation:  Patient received Pre-Mod Electrical Stimulation for pain control applied to the Left Shoulder. Pt received stimulation at a frequency of  Hz for 0  minutes. Patient tolerated treatment well without any adverse effects.      Anitra x 15 Min       Home Exercises Provided and Patient Education Provided     Education provided: AVS    Written Home Exercises Provided: Patient instructed to cont prior HEP.  Exercises were reviewed and Tess was able to demonstrate them prior to the end of the session.  Tess demonstrated good  understanding of the education provided.     See EMR under Patient Instructions for exercises provided prior visit.    Assessment     Pt tolerated all therapeutic exercises and manual ROM today with mild c/o pain. ROM is doing well as noted in objective with firm end feel at end ranges. Progressed light AROM exercises with no c/o pain. Educated pt to be diligent with her HEP. Ended session with Anitra to help with pain/soreness.      P.M.H:  Tess is a 42 y.o. female referred to outpatient Physical Therapy with a medical diagnosis of Left RCR. Patient underwent RCR on 7/27/2022. She is here today for her Outpatient Physical Therapy Evaluation in an Abduction Wedge and Sling. She is to be Passive Range of Motion at this time. The therapist initiated a Home Exercise Program of Scap Ret, Pendulums, and Table Slides today. Therapist will progress patient per her MD protocol. Patient will require Physical Therapy intervention to address all deficits and work toward completion of all goals set.        Tess Is progressing well towards her  goals.   Pt prognosis is Good.     Pt will continue to benefit from skilled outpatient physical therapy to address the deficits listed in the problem list box on initial evaluation, provide pt/family education and to maximize pt's level of independence in the home and community environment.     Pt's spiritual, cultural and educational needs considered and pt agreeable to plan of care and goals.     Anticipated barriers to physical therapy: None    Goals :   Short Term Goals : 4 weeks   Independent with Home Exercise Program   Increase Left Shoulder Flexion and Abduction Passive Range of Motion to 120 degrees   Increase Left Shoulder Passive Internal Rotation and External Rotation Range of Motion to 60 degrees   Increase Left Shoulder Strength to 3/5   Decrease complaints of Left Shoulder Pain to 4/10 with Activity      Long Term Goals : 12 weeks   1.Patient will perform 30 minutes of Activity with use of Left Shoulder with Pain Less than or Equal to 3/10  2. Increase Active Range of Motion to Within Normal Limits   3. Increase Left Shoulder and Scapular Strength to 4/5      Plan     Plan of care Certification: 8/8/2022 to 11/4/2022.     Outpatient Physical Therapy 2 times weekly for 12 weeks to include the following interventions: Electrical Stimulation to decrease pain and inflammation as needed, Manual Therapy, Moist Heat/ Ice, Neuromuscular Re-ed, Patient Education and Therapeutic Exercise.       Vinod Dawkins, PTA,   8/29/2022

## 2022-08-31 ENCOUNTER — CLINICAL SUPPORT (OUTPATIENT)
Dept: REHABILITATION | Facility: HOSPITAL | Age: 42
End: 2022-08-31
Payer: MEDICAID

## 2022-08-31 DIAGNOSIS — M75.122 NONTRAUMATIC COMPLETE TEAR OF LEFT ROTATOR CUFF: Primary | ICD-10-CM

## 2022-08-31 DIAGNOSIS — R29.898 WEAKNESS OF LEFT ARM: ICD-10-CM

## 2022-08-31 DIAGNOSIS — M25.612 DECREASED RANGE OF MOTION OF LEFT SHOULDER: ICD-10-CM

## 2022-08-31 DIAGNOSIS — M25.412 PAIN AND SWELLING OF LEFT SHOULDER: ICD-10-CM

## 2022-08-31 DIAGNOSIS — M25.512 PAIN AND SWELLING OF LEFT SHOULDER: ICD-10-CM

## 2022-08-31 PROCEDURE — 97140 MANUAL THERAPY 1/> REGIONS: CPT

## 2022-08-31 PROCEDURE — 97110 THERAPEUTIC EXERCISES: CPT

## 2022-08-31 NOTE — PROGRESS NOTES
[facility] Treatment Note     Name: Tess Liborio  Clinic Number: [id number]    Visit Date: 8/31/2022  Therapy Diagnosis:            Encounter Diagnoses   Name Primary?    Strain of tendon of left rotator cuff      Complete rotator cuff tear or rupture of left shoulder, not specified as traumatic        Physician: Casi David PA-C     Physician Orders: PT Eval and Treat   Medical Diagnosis from Referral: Left RCR  Evaluation Date: 8/8/2022  Date of Surgery : 7/25/2022  Updated Plan of Care Due : 9/7/2022  Authorization Period Expiration: 8/2/2023  Plan of Care Expiration: 11/4/2022    Visit # / Visits authorized: 6 / Unlimited   PTA Visit #: 2/5    Time In: 10:00 am  Time Out: 10:53 am   Total Billable Time: 53 minutes    Precautions: Standard    Prior Level of Function: Able to perform all ADLs and Activities Independently   Current Level of Function: Unable to use Left Upper Extremity at this time         Subjective     Pt reports: her shoulder is sore today but is feeling a little better   She was compliant with home exercise program.    Pain: 6/10  Location: left shoulder      Objective     Passive Shoulder Flexion: 145  Passive Shoulder Abduction: 140  Passive Shoulder IR: WFL  Passive Shoulder ER: NTV  Passive Shoulder Extension: NTV    Tess received therapeutic exercises to develop strength, endurance and ROM for 38 minutes including:       UBE  5 Min    Pulleys  6 Min    Cane IR/Flexion  20x   Supine Cane Flexion  20x   Cane flexion on wall   15x with support of Therapist (Added 8/31)   Supine SA Punches  3x10   Sidelying ER  3x10    Prone - Row's   x 20 (Added 8/19)   Prone - W's  x 20 (Added 8/19)   Prone - T's  x 20 (Added 8/19)   Prone - Shoulder Ext/Scap Retraction                 Gripping           Tess received the following manual therapy techniques: Joint mobilizations, Manual traction and Myofacial release were applied to the: shoulder for 15 minutes, including:        PROM with End Range Stretch  X   Capsular Mobilizations   X   Scapular PNF     Deep Tissue/Myofascial                 Tess participated in neuromuscular re-education activities to improve: Balance, Coordination, Kinesthetic and Proprioception for 0 minutes. The following activities were included:      Patient received the following supervised modalities after being cleared for contradictions: Pre-Mod Electrical Stimulation:  Patient received Pre-Mod Electrical Stimulation for pain control applied to the Left Shoulder. Pt received stimulation at a frequency of  Hz for 0  minutes. Patient tolerated treatment well without any adverse effects.      GameReady x 0 Min       Home Exercises Provided and Patient Education Provided     Education provided: AVS    Written Home Exercises Provided: Patient instructed to cont prior HEP.  Exercises were reviewed and Tess was able to demonstrate them prior to the end of the session.  Tess demonstrated good  understanding of the education provided.     See EMR under Patient Instructions for exercises provided prior visit.    Assessment     Patient is 5 weeks Post Op at this time. Per protocol she may begin Active Range of Motion around week 7. She continues to be Passive Range of Motion at this time. Therapist working on proper form for all exercises and working to decrease over-activation of the Upper Traps. Patient works hard in Therapy and is very diligent with her Home Exercise Program. Sup Visit performed today with SAMIRA Melchor and SAMIRA Gonzalez.  All goals and treatment plan reviewed. Will work toward completion of all goals set.             P.M.H:  Tess is a 42 y.o. female referred to outpatient Physical Therapy with a medical diagnosis of Left RCR. Patient underwent RCR on 7/27/2022. She is here today for her Outpatient Physical Therapy Evaluation in an Abduction Wedge and Sling. She is to be Passive Range of Motion at this time. The  therapist initiated a Home Exercise Program of Scap Ret, Pendulums, and Table Slides today. Therapist will progress patient per her MD protocol. Patient will require Physical Therapy intervention to address all deficits and work toward completion of all goals set.        Tess Is progressing well towards her goals.   Pt prognosis is Good.     Pt will continue to benefit from skilled outpatient physical therapy to address the deficits listed in the problem list box on initial evaluation, provide pt/family education and to maximize pt's level of independence in the home and community environment.     Pt's spiritual, cultural and educational needs considered and pt agreeable to plan of care and goals.     Anticipated barriers to physical therapy: None    Goals :   Short Term Goals : 4 weeks   Independent with Home Exercise Program   Increase Left Shoulder Flexion and Abduction Passive Range of Motion to 120 degrees   Increase Left Shoulder Passive Internal Rotation and External Rotation Range of Motion to 60 degrees   Increase Left Shoulder Strength to 3/5   Decrease complaints of Left Shoulder Pain to 4/10 with Activity      Long Term Goals : 12 weeks   1.Patient will perform 30 minutes of Activity with use of Left Shoulder with Pain Less than or Equal to 3/10  2. Increase Active Range of Motion to Within Normal Limits   3. Increase Left Shoulder and Scapular Strength to 4/5      Plan     Plan of care Certification: 8/8/2022 to 11/4/2022.     Outpatient Physical Therapy 2 times weekly for 12 weeks to include the following interventions: Electrical Stimulation to decrease pain and inflammation as needed, Manual Therapy, Moist Heat/ Ice, Neuromuscular Re-ed, Patient Education and Therapeutic Exercise.       SUJEY LLAMAS, PT, DPT   8/31/2022

## 2022-09-06 ENCOUNTER — CLINICAL SUPPORT (OUTPATIENT)
Dept: REHABILITATION | Facility: HOSPITAL | Age: 42
End: 2022-09-06
Payer: MEDICAID

## 2022-09-06 DIAGNOSIS — M25.412 PAIN AND SWELLING OF LEFT SHOULDER: ICD-10-CM

## 2022-09-06 DIAGNOSIS — R29.898 WEAKNESS OF LEFT ARM: ICD-10-CM

## 2022-09-06 DIAGNOSIS — M75.122 NONTRAUMATIC COMPLETE TEAR OF LEFT ROTATOR CUFF: Primary | ICD-10-CM

## 2022-09-06 DIAGNOSIS — M25.512 PAIN AND SWELLING OF LEFT SHOULDER: ICD-10-CM

## 2022-09-06 DIAGNOSIS — M25.612 DECREASED RANGE OF MOTION OF LEFT SHOULDER: ICD-10-CM

## 2022-09-06 PROCEDURE — 97140 MANUAL THERAPY 1/> REGIONS: CPT | Mod: CQ

## 2022-09-06 PROCEDURE — 97014 ELECTRIC STIMULATION THERAPY: CPT | Mod: CQ

## 2022-09-06 PROCEDURE — 97110 THERAPEUTIC EXERCISES: CPT | Mod: CQ

## 2022-09-06 NOTE — PROGRESS NOTES
[facility] Treatment Note     Name: Tess Liborio  Clinic Number: [id number]    Visit Date: 9/6/2022  Therapy Diagnosis:            Encounter Diagnoses   Name Primary?    Strain of tendon of left rotator cuff      Complete rotator cuff tear or rupture of left shoulder, not specified as traumatic        Physician: Casi David PA-C     Physician Orders: PT Eval and Treat   Medical Diagnosis from Referral: Left RCR  Evaluation Date: 8/8/2022  Date of Surgery : 7/25/2022  Updated Plan of Care Due : 9/7/2022  Authorization Period Expiration: 8/2/2023  Plan of Care Expiration: 11/4/2022    Visit # / Visits authorized: 8 / Unlimited   PTA Visit #: 2/5    Time In: 10:40 am  Time Out: 11:33 am   Total Billable Time: 53 minutes    Precautions: Standard    Prior Level of Function: Able to perform all ADLs and Activities Independently   Current Level of Function: Unable to use Left Upper Extremity at this time    Subjective     Pt reports: her shoulder is sore today because she thinks she is over doing it at home with her exercises.   She was compliant with home exercise program.    Pain: 7/10  Location: left shoulder      Objective     Passive Shoulder Flexion: 145  Passive Shoulder Abduction: 140  Passive Shoulder IR: WFL  Passive Shoulder ER: NTV  Passive Shoulder Extension: NTV    Tess received therapeutic exercises to develop strength, endurance and ROM for 30 minutes including:       UBE  5 Min  fwd and rev    Pulleys  6 Min flexion and abduction    Cane IR/Flexion  20x   Supine Cane Flexion  20x   Cane flexion on wall   20x with support of Therapist (Added 8/31)   Supine SA Punches  3x10   Sidelying ER  3x10    Prone - Row's   x 20 (Added 8/19)   Prone - W's  x 20 (Added 8/19)   Prone - T's  x 20 (Added 8/19)   Prone - Shoulder Ext/Scap Retraction                 Gripping           Tess received the following manual therapy techniques: Joint mobilizations, Manual traction and Myofacial  release were applied to the: shoulder for 10 minutes, including:       PROM with End Range Stretch  X   Capsular Mobilizations   X   Scapular PNF     Deep Tissue/Myofascial                 Tess participated in neuromuscular re-education activities to improve: Balance, Coordination, Kinesthetic and Proprioception for 0 minutes. The following activities were included:      Patient received the following supervised modalities after being cleared for contradictions: Pre-Mod Electrical Stimulation:  Patient received Pre-Mod Electrical Stimulation for pain control applied to the Left Shoulder. Pt received stimulation at a frequency of  Hz for 10 minutes. Patient tolerated treatment well without any adverse effects.      MHP to left shoulder for 10 minutes       Home Exercises Provided and Patient Education Provided     Education provided: AVS    Written Home Exercises Provided: Patient instructed to cont prior HEP.  Exercises were reviewed and Tess was able to demonstrate them prior to the end of the session.  Tess demonstrated good  understanding of the education provided.     See EMR under Patient Instructions for exercises provided prior visit.    Assessment     Patient will be 6 weeks post op tomorrow. Per protocol she may begin Active Range of Motion around week 7. She continues to be Passive Range of Motion at this time. She sees MD on 9/12 for follow up. Therapist working on proper form for all exercises and working to decrease over-activation of the Upper Traps. Patient works hard in Therapy and is very diligent with her Home Exercise Program. Educated pt on importance with home exercise program adherence and passive range of motion using towel for assistance into flexion and abduction with opposite upper extremity providing support.  Will work toward completion of all goals set within MD post operative protocol.           P.M.H:  Tess is a 42 y.o. female referred to outpatient Physical  Therapy with a medical diagnosis of Left RCR. Patient underwent RCR on 7/27/2022. She is here today for her Outpatient Physical Therapy Evaluation in an Abduction Wedge and Sling. She is to be Passive Range of Motion at this time. The therapist initiated a Home Exercise Program of Scap Ret, Pendulums, and Table Slides today. Therapist will progress patient per her MD protocol. Patient will require Physical Therapy intervention to address all deficits and work toward completion of all goals set.        Tess Is progressing well towards her goals.   Pt prognosis is Good.     Pt will continue to benefit from skilled outpatient physical therapy to address the deficits listed in the problem list box on initial evaluation, provide pt/family education and to maximize pt's level of independence in the home and community environment.     Pt's spiritual, cultural and educational needs considered and pt agreeable to plan of care and goals.     Anticipated barriers to physical therapy: None    Goals :   Short Term Goals : 4 weeks   Independent with Home Exercise Program   Increase Left Shoulder Flexion and Abduction Passive Range of Motion to 120 degrees   Increase Left Shoulder Passive Internal Rotation and External Rotation Range of Motion to 60 degrees   Increase Left Shoulder Strength to 3/5   Decrease complaints of Left Shoulder Pain to 4/10 with Activity      Long Term Goals : 12 weeks   1.Patient will perform 30 minutes of Activity with use of Left Shoulder with Pain Less than or Equal to 3/10  2. Increase Active Range of Motion to Within Normal Limits   3. Increase Left Shoulder and Scapular Strength to 4/5      Plan     Plan of care Certification: 8/8/2022 to 11/4/2022.     Outpatient Physical Therapy 2 times weekly for 12 weeks to include the following interventions: Electrical Stimulation to decrease pain and inflammation as needed, Manual Therapy, Moist Heat/ Ice, Neuromuscular Re-ed, Patient Education and  Therapeutic Exercise.       Jameson Burns, PTA   9/6/2022

## 2022-09-09 ENCOUNTER — CLINICAL SUPPORT (OUTPATIENT)
Dept: REHABILITATION | Facility: HOSPITAL | Age: 42
End: 2022-09-09
Payer: MEDICAID

## 2022-09-09 DIAGNOSIS — M25.412 PAIN AND SWELLING OF LEFT SHOULDER: ICD-10-CM

## 2022-09-09 DIAGNOSIS — M75.122 NONTRAUMATIC COMPLETE TEAR OF LEFT ROTATOR CUFF: Primary | ICD-10-CM

## 2022-09-09 DIAGNOSIS — R29.898 WEAKNESS OF LEFT ARM: ICD-10-CM

## 2022-09-09 DIAGNOSIS — M25.512 PAIN AND SWELLING OF LEFT SHOULDER: ICD-10-CM

## 2022-09-09 DIAGNOSIS — M25.612 DECREASED RANGE OF MOTION OF LEFT SHOULDER: ICD-10-CM

## 2022-09-09 PROCEDURE — 97110 THERAPEUTIC EXERCISES: CPT

## 2022-09-09 PROCEDURE — 97140 MANUAL THERAPY 1/> REGIONS: CPT

## 2022-09-09 NOTE — PROGRESS NOTES
[facility] Treatment Note     Name: Tess Capone  Clinic Number: [id number]    Visit Date: 9/9/2022  Therapy Diagnosis:            Encounter Diagnoses   Name Primary?    Strain of tendon of left rotator cuff      Complete rotator cuff tear or rupture of left shoulder, not specified as traumatic        Physician: Casi David PA-C    Ortho MD : Girish Norwood     Physician Orders: PT Eval and Treat   Medical Diagnosis from Referral: Left RCR  Evaluation Date: 8/8/2022  Date of Surgery : 7/25/2022  Updated Plan of Care Due : 10/8/2022  Authorization Period Expiration: 8/2/2023  Plan of Care Expiration: 11/4/2022    Visit # / Visits authorized: 9 / Unlimited   PTA Visit #: 2/5    Time In: 9:56 am  Time Out: 10:55 am   Total Billable Time: 55 minutes    Precautions: Standard    Prior Level of Function: Able to perform all ADLs and Activities Independently   Current Level of Function: Unable to use Left Upper Extremity at this time    Subjective     Pt reports: her shoulder is doing ok today. She is supposed to go see her Surgeon next week   She was compliant with home exercise program.    Pain: 6/10  Location: left shoulder      Objective     Passive Shoulder Flexion: 150  Passive Shoulder Abduction: 142  Passive Shoulder IR: 45  Passive Shoulder ER: 70      Tess received therapeutic exercises to develop strength, endurance and ROM for 45 minutes including:         UBE  6 Min  fwd and rev    Pulleys  6 Min flexion and abduction    Cane IR/Flexion  20x   Supine Cane Flexion  20x   Cane flexion on wall   20x with support of Therapist (Added 8/31)   Supine SA Punches  3x10   Sidelying ER  3x10    Prone - Row's   x 20 (Added 8/19)   Prone - W's  x 20 (Added 8/19)   Prone - T's  x 20 (Added 8/19)   Prone - Shoulder Ext/Scap Retraction         Wall Slides - Flex and Abd   X 10 each with towel        Gripping           Tess received the following manual therapy techniques: Joint mobilizations,  Manual traction and Myofacial release were applied to the: shoulder for 10 minutes, including:       PROM with End Range Stretch  X   Capsular Mobilizations   X   Scapular PNF     Deep Tissue/Myofascial                 Tess participated in neuromuscular re-education activities to improve: Balance, Coordination, Kinesthetic and Proprioception for 0 minutes. The following activities were included:      Patient received the following supervised modalities after being cleared for contradictions: Pre-Mod Electrical Stimulation:  Patient received Pre-Mod Electrical Stimulation for pain control applied to the Left Shoulder. Pt received stimulation at a frequency of  Hz for 0 minutes. Patient tolerated treatment well without any adverse effects.      MHP to left shoulder for 0 minutes       Home Exercises Provided and Patient Education Provided     Education provided: AVS    Written Home Exercises Provided: Patient instructed to cont prior HEP.  Exercises were reviewed and Tess was able to demonstrate them prior to the end of the session.  Tess demonstrated good  understanding of the education provided.     See EMR under Patient Instructions for exercises provided prior visit.    Assessment     Patient has received 5 weeks and 9 visits with Physical Therapy. Patient is 6 weeks post op. Per protocol she may begin Active Range of Motion around week 7. She continues to be Passive Range of Motion at this time. She sees MD on 9/12 for follow up. Therapist working on proper form for all exercises and working to decrease over-activation of the Upper Traps. Patient works hard in Therapy and is very diligent with her Home Exercise Program. She is progressing very well with her Passive Range of Motion. Expect to be able to progress to Active Range of Motion next week per protocol and with MD approval. Sup Visit performed today with SAMIRA Melchor and SAMIRA Gonzalez.  All goals and treatment plan reviewed.  Will work toward completion of all goals set.     Measurements for 9/9/2022 are as follows :   Passive Shoulder Flexion: 150  Passive Shoulder Abduction: 142  Passive Shoulder IR: 45  Passive Shoulder ER: 70              P.M.H:  Tess is a 42 y.o. female referred to outpatient Physical Therapy with a medical diagnosis of Left RCR. Patient underwent RCR on 7/27/2022. She is here today for her Outpatient Physical Therapy Evaluation in an Abduction Wedge and Sling. She is to be Passive Range of Motion at this time. The therapist initiated a Home Exercise Program of Scap Ret, Pendulums, and Table Slides today. Therapist will progress patient per her MD protocol. Patient will require Physical Therapy intervention to address all deficits and work toward completion of all goals set.        Tess Is progressing well towards her goals.   Pt prognosis is Good.     Pt will continue to benefit from skilled outpatient physical therapy to address the deficits listed in the problem list box on initial evaluation, provide pt/family education and to maximize pt's level of independence in the home and community environment.     Pt's spiritual, cultural and educational needs considered and pt agreeable to plan of care and goals.     Anticipated barriers to physical therapy: None    Goals :   Short Term Goals : 4 weeks   Independent with Home Exercise Program - Goal Ongoing   Increase Left Shoulder Flexion and Abduction Passive Range of Motion to 120 degrees - Goal Met and Upgraded to Active Range of Motion   Increase Left Shoulder Passive Internal Rotation and External Rotation Range of Motion to 60 degrees  - Goal Partially Met and Upgraded to Active Range of Motion   Increase Left Shoulder Strength to 3/5 - Goal Not Met   Decrease complaints of Left Shoulder Pain to 4/10 with Activity - Goal Not Met      Long Term Goals : 12 weeks   1.Patient will perform 30 minutes of Activity with use of Left Shoulder with Pain Less than or  Equal to 3/10 - Goal Not Met   2. Increase Active Range of Motion to Within Normal Limits - Goal Not Met   3. Increase Left Shoulder and Scapular Strength to 4/5 - Goal Not Met       Plan     Plan of care Certification: 8/8/2022 to 11/4/2022.     Outpatient Physical Therapy 2 times weekly for 12 weeks to include the following interventions: Electrical Stimulation to decrease pain and inflammation as needed, Manual Therapy, Moist Heat/ Ice, Neuromuscular Re-ed, Patient Education and Therapeutic Exercise.       SUJEY LLAMAS, PT, DPT   9/9/2022

## 2022-09-09 NOTE — PLAN OF CARE
[facility] Updated Plan of Care      Name: Tess Capone  Clinic Number: [id number]    Visit Date: 9/9/2022  Therapy Diagnosis:            Encounter Diagnoses   Name Primary?    Strain of tendon of left rotator cuff      Complete rotator cuff tear or rupture of left shoulder, not specified as traumatic        Physician: Casi David PA-C    Ortho MD : Girish Norwood     Physician Orders: PT Eval and Treat   Medical Diagnosis from Referral: Left RCR  Evaluation Date: 8/8/2022  Date of Surgery : 7/25/2022  Updated Plan of Care Due : 10/8/2022  Authorization Period Expiration: 8/2/2023  Plan of Care Expiration: 11/4/2022    Visit # / Visits authorized: 9 / Unlimited   PTA Visit #: 2/5    Time In: 9:56 am  Time Out: 10:55 am   Total Billable Time: 55 minutes    Precautions: Standard    Prior Level of Function: Able to perform all ADLs and Activities Independently   Current Level of Function: Unable to use Left Upper Extremity at this time    Subjective     Pt reports: her shoulder is doing ok today. She is supposed to go see her Surgeon next week   She was compliant with home exercise program.    Pain: 6/10  Location: left shoulder      Objective     Passive Shoulder Flexion: 150  Passive Shoulder Abduction: 142  Passive Shoulder IR: 45  Passive Shoulder ER: 70      Tess received therapeutic exercises to develop strength, endurance and ROM for 45 minutes including:         UBE  6 Min  fwd and rev    Pulleys  6 Min flexion and abduction    Cane IR/Flexion  20x   Supine Cane Flexion  20x   Cane flexion on wall   20x with support of Therapist (Added 8/31)   Supine SA Punches  3x10   Sidelying ER  3x10    Prone - Row's   x 20 (Added 8/19)   Prone - W's  x 20 (Added 8/19)   Prone - T's  x 20 (Added 8/19)   Prone - Shoulder Ext/Scap Retraction         Wall Slides - Flex and Abd   X 10 each with towel        Gripping           Tess received the following manual therapy techniques: Joint  mobilizations, Manual traction and Myofacial release were applied to the: shoulder for 10 minutes, including:       PROM with End Range Stretch  X   Capsular Mobilizations   X   Scapular PNF     Deep Tissue/Myofascial                 Tess participated in neuromuscular re-education activities to improve: Balance, Coordination, Kinesthetic and Proprioception for 0 minutes. The following activities were included:      Patient received the following supervised modalities after being cleared for contradictions: Pre-Mod Electrical Stimulation:  Patient received Pre-Mod Electrical Stimulation for pain control applied to the Left Shoulder. Pt received stimulation at a frequency of  Hz for 0 minutes. Patient tolerated treatment well without any adverse effects.      MHP to left shoulder for 0 minutes       Home Exercises Provided and Patient Education Provided     Education provided: AVS    Written Home Exercises Provided: Patient instructed to cont prior HEP.  Exercises were reviewed and Tess was able to demonstrate them prior to the end of the session.  Tess demonstrated good  understanding of the education provided.     See EMR under Patient Instructions for exercises provided prior visit.    Assessment     Patient has received 5 weeks and 9 visits with Physical Therapy. Patient is 6 weeks post op. Per protocol she may begin Active Range of Motion around week 7. She continues to be Passive Range of Motion at this time. She sees MD on 9/12 for follow up. Therapist working on proper form for all exercises and working to decrease over-activation of the Upper Traps. Patient works hard in Therapy and is very diligent with her Home Exercise Program. She is progressing very well with her Passive Range of Motion. Expect to be able to progress to Active Range of Motion next week per protocol and with MD approval. Sup Visit performed today with SAMIRA Melchor and SAMIRA Gonzalez.  All goals and treatment  plan reviewed. Will work toward completion of all goals set.     Measurements for 9/9/2022 are as follows :   Passive Shoulder Flexion: 150  Passive Shoulder Abduction: 142  Passive Shoulder IR: 45  Passive Shoulder ER: 70              P.M.H:  Tess is a 42 y.o. female referred to outpatient Physical Therapy with a medical diagnosis of Left RCR. Patient underwent RCR on 7/27/2022. She is here today for her Outpatient Physical Therapy Evaluation in an Abduction Wedge and Sling. She is to be Passive Range of Motion at this time. The therapist initiated a Home Exercise Program of Scap Ret, Pendulums, and Table Slides today. Therapist will progress patient per her MD protocol. Patient will require Physical Therapy intervention to address all deficits and work toward completion of all goals set.        Tess Is progressing well towards her goals.   Pt prognosis is Good.     Pt's spiritual, cultural and educational needs considered and pt agreeable to plan of care and goals.     Anticipated barriers to physical therapy: None    Goals :   Short Term Goals : 4 weeks   Independent with Home Exercise Program - Goal Ongoing   Increase Left Shoulder Flexion and Abduction Passive Range of Motion to 120 degrees - Goal Met and Upgraded to Active Range of Motion   Increase Left Shoulder Passive Internal Rotation and External Rotation Range of Motion to 60 degrees  - Goal Partially Met and Upgraded to Active Range of Motion   Increase Left Shoulder Strength to 3/5 - Goal Not Met   Decrease complaints of Left Shoulder Pain to 4/10 with Activity - Goal Not Met      Long Term Goals : 12 weeks   1.Patient will perform 30 minutes of Activity with use of Left Shoulder with Pain Less than or Equal to 3/10 - Goal Not Met   2. Increase Active Range of Motion to Within Normal Limits - Goal Not Met   3. Increase Left Shoulder and Scapular Strength to 4/5 - Goal Not Met     Reasons for Recertification of Therapy: Pt will continue to  benefit from skilled outpatient physical therapy to address the deficits listed in the problem list box on initial evaluation, provide pt/family education and to maximize pt's level of independence in the home and community environment.     Plan     Updated Certification Period: 9/9/2022 to 11/4/2022  Recommended Treatment Plan: 2 times per week for 8 weeks: Electrical Stimulation to decrease pain and inflammation as needed, Manual Therapy, Moist Heat/ Ice, Neuromuscular Re-ed, Patient Education, and Therapeutic Exercise      SUJEY LLAMAS, PT, DPT   9/9/2022      I CERTIFY THE NEED FOR THESE SERVICES FURNISHED UNDER THIS PLAN OF TREATMENT AND WHILE UNDER MY CARE.    Physician's comments:      Physician's Signature: ___________________________________________________

## 2022-09-14 ENCOUNTER — CLINICAL SUPPORT (OUTPATIENT)
Dept: REHABILITATION | Facility: HOSPITAL | Age: 42
End: 2022-09-14
Payer: MEDICAID

## 2022-09-14 DIAGNOSIS — R29.898 WEAKNESS OF LEFT ARM: ICD-10-CM

## 2022-09-14 DIAGNOSIS — M75.122 NONTRAUMATIC COMPLETE TEAR OF LEFT ROTATOR CUFF: Primary | ICD-10-CM

## 2022-09-14 DIAGNOSIS — M25.512 PAIN AND SWELLING OF LEFT SHOULDER: ICD-10-CM

## 2022-09-14 DIAGNOSIS — M25.412 PAIN AND SWELLING OF LEFT SHOULDER: ICD-10-CM

## 2022-09-14 DIAGNOSIS — M25.612 DECREASED RANGE OF MOTION OF LEFT SHOULDER: ICD-10-CM

## 2022-09-14 PROCEDURE — 97110 THERAPEUTIC EXERCISES: CPT

## 2022-09-14 PROCEDURE — 97140 MANUAL THERAPY 1/> REGIONS: CPT

## 2022-09-14 NOTE — PROGRESS NOTES
"  [facility] Treatment Note     Name: Tess Capone  Clinic Number: [id number]    Visit Date: 9/14/2022  Therapy Diagnosis:            Encounter Diagnoses   Name Primary?    Strain of tendon of left rotator cuff      Complete rotator cuff tear or rupture of left shoulder, not specified as traumatic        Physician: Casi David PA-C    Ortho MD : Girish Norwood     Physician Orders: PT Eval and Treat   Medical Diagnosis from Referral: Left RCR  Evaluation Date: 8/8/2022  Date of Surgery : 7/25/2022  Updated Plan of Care Due : 10/8/2022  Authorization Period Expiration: 8/2/2023  Plan of Care Expiration: 11/4/2022    Visit # / Visits authorized: 9 / Unlimited   PTA Visit #: 2/5    Time In: 9:55 am  Time Out: 10:55 am   Total Billable Time: 55 minutes    Precautions: Standard    Prior Level of Function: Able to perform all ADLs and Activities Independently   Current Level of Function: Unable to use Left Upper Extremity at this time    Subjective     Pt reports: She saw her surgeon last week and she "moved her to the next phase."  She was compliant with home exercise program.    Pain: 4/10  Location: left shoulder      Objective     Passive Shoulder Flexion: 150  Passive Shoulder Abduction: 142  Passive Shoulder IR: 45  Passive Shoulder ER: 70      Tess received therapeutic exercises to develop strength, endurance and ROM for 45 minutes including:       Shoulder Exercises       UBE  6 Min Fwd/Rev   Pulleys  6 Min Flex/Abd   Corner Stretch     Cane Flexion on Wall   2 x 10    Cane Flexion off Wall   2 x 10    Cybex Rows - Close   X 10    Cybex Rows - Wide  X 10        Active Range of Motion :     Flexion   2 x 10     Abduction   2 x 10     External Rotation   2 x 10     IR- Wand behind back   2 x 10            Table Slides - Flex and Abd    Wall Slides - Flex and Abd   2 x 5 each with Min assist from therapist       Isometrics - all directions     Shoulder Ext/Scap Ret with band   2 x 10 with " Blue Thera Tubing            Serratus Punches   2 x 10    Supine Cane Flexion   2 x 10            Tess received the following manual therapy techniques: Joint mobilizations, Manual traction and Myofacial release were applied to the: shoulder for 10 minutes, including:       PROM with End Range Stretch  X   Capsular Mobilizations   X   Scapular PNF     Deep Tissue/Myofascial                 Tess participated in neuromuscular re-education activities to improve: Balance, Coordination, Kinesthetic and Proprioception for 0 minutes. The following activities were included:      Patient received the following supervised modalities after being cleared for contradictions: Pre-Mod Electrical Stimulation:  Patient received Pre-Mod Electrical Stimulation for pain control applied to the Left Shoulder. Pt received stimulation at a frequency of  Hz for 0 minutes. Patient tolerated treatment well without any adverse effects.      MHP to left shoulder for 0 minutes       Home Exercises Provided and Patient Education Provided     Education provided: AVS    Written Home Exercises Provided: Patient instructed to cont prior HEP.  Exercises were reviewed and Tess was able to demonstrate them prior to the end of the session.  Tess demonstrated good  understanding of the education provided.     See EMR under Patient Instructions for exercises provided prior visit.    Assessment     Patient saw the Ortho Surgeon on 9/12/2022. Surgeon sent orders to move patient to the next phase and to continue Therapy for 6 more weeks. Therapist upgraded patient to the Active Range of Motion phase today. Instructed patient on proper form for all the new exercises. She was able to perform these well with no increase in pain. Will work to progress patient  safely through the new protocol. Instructed patient to begin wall slides at home. Patient agrees. Sup Visit performed today with SAMIRA Melchor and SAMIRA Gonzalez.  All goals  and treatment plan reviewed. Will work toward completion of all goals set.         Measurements for 9/9/2022 are as follows :   Passive Shoulder Flexion: 150  Passive Shoulder Abduction: 142  Passive Shoulder IR: 45  Passive Shoulder ER: 70              P.M.H:  Tess is a 42 y.o. female referred to outpatient Physical Therapy with a medical diagnosis of Left RCR. Patient underwent RCR on 7/27/2022. She is here today for her Outpatient Physical Therapy Evaluation in an Abduction Wedge and Sling. She is to be Passive Range of Motion at this time. The therapist initiated a Home Exercise Program of Scap Ret, Pendulums, and Table Slides today. Therapist will progress patient per her MD protocol. Patient will require Physical Therapy intervention to address all deficits and work toward completion of all goals set.        Tess Is progressing well towards her goals.   Pt prognosis is Good.     Pt will continue to benefit from skilled outpatient physical therapy to address the deficits listed in the problem list box on initial evaluation, provide pt/family education and to maximize pt's level of independence in the home and community environment.     Pt's spiritual, cultural and educational needs considered and pt agreeable to plan of care and goals.     Anticipated barriers to physical therapy: None    Goals :   Short Term Goals : 4 weeks   Independent with Home Exercise Program - Goal Ongoing   Increase Left Shoulder Flexion and Abduction Passive Range of Motion to 120 degrees - Goal Met and Upgraded to Active Range of Motion   Increase Left Shoulder Passive Internal Rotation and External Rotation Range of Motion to 60 degrees  - Goal Partially Met and Upgraded to Active Range of Motion   Increase Left Shoulder Strength to 3/5 - Goal Not Met   Decrease complaints of Left Shoulder Pain to 4/10 with Activity - Goal Not Met      Long Term Goals : 12 weeks   1.Patient will perform 30 minutes of Activity with use of Left  Shoulder with Pain Less than or Equal to 3/10 - Goal Not Met   2. Increase Active Range of Motion to Within Normal Limits - Goal Not Met   3. Increase Left Shoulder and Scapular Strength to 4/5 - Goal Not Met       Plan     Plan of care Certification: 8/8/2022 to 11/4/2022.     Outpatient Physical Therapy 2 times weekly for 12 weeks to include the following interventions: Electrical Stimulation to decrease pain and inflammation as needed, Manual Therapy, Moist Heat/ Ice, Neuromuscular Re-ed, Patient Education and Therapeutic Exercise.       SUJEY LLAMAS, PT, DPT   9/14/2022

## 2022-09-16 ENCOUNTER — CLINICAL SUPPORT (OUTPATIENT)
Dept: REHABILITATION | Facility: HOSPITAL | Age: 42
End: 2022-09-16
Payer: MEDICAID

## 2022-09-16 DIAGNOSIS — M25.612 DECREASED RANGE OF MOTION OF LEFT SHOULDER: ICD-10-CM

## 2022-09-16 DIAGNOSIS — M25.412 PAIN AND SWELLING OF LEFT SHOULDER: ICD-10-CM

## 2022-09-16 DIAGNOSIS — M75.122 NONTRAUMATIC COMPLETE TEAR OF LEFT ROTATOR CUFF: Primary | ICD-10-CM

## 2022-09-16 DIAGNOSIS — R29.898 WEAKNESS OF LEFT ARM: ICD-10-CM

## 2022-09-16 DIAGNOSIS — M25.512 PAIN AND SWELLING OF LEFT SHOULDER: ICD-10-CM

## 2022-09-16 PROCEDURE — 97110 THERAPEUTIC EXERCISES: CPT

## 2022-09-16 PROCEDURE — 97140 MANUAL THERAPY 1/> REGIONS: CPT

## 2022-09-16 NOTE — PROGRESS NOTES
[facility] Treatment Note     Name: Tess Capone  Clinic Number: [id number]    Visit Date: 9/16/2022  Therapy Diagnosis:            Encounter Diagnoses   Name Primary?    Strain of tendon of left rotator cuff      Complete rotator cuff tear or rupture of left shoulder, not specified as traumatic        Physician: Casi David PA-C    Ortho MD : Girish Norwood     Physician Orders: PT Eval and Treat   Medical Diagnosis from Referral: Left RCR  Evaluation Date: 8/8/2022  Date of Surgery : 7/25/2022  Updated Plan of Care Due : 10/8/2022  Authorization Period Expiration: 8/2/2023  Plan of Care Expiration: 11/4/2022    Visit # / Visits authorized: 11 / Unlimited   PTA Visit #: 2/5    Time In: 9:55 am  Time Out: 10:33 am   Total Billable Time: 38 minutes    Precautions: Standard    Prior Level of Function: Able to perform all ADLs and Activities Independently   Current Level of Function: Unable to use Left Upper Extremity at this time    Subjective     Pt reports: she was pretty sore after starting Phase 2 at the last treatment   She was compliant with home exercise program.    Pain: 8/10  Location: left shoulder      Objective     Passive Shoulder Flexion: 150  Passive Shoulder Abduction: 142  Passive Shoulder IR: 45  Passive Shoulder ER: 70      Tess received therapeutic exercises to develop strength, endurance and ROM for 30 minutes including:       Shoulder Exercises   Exercises in red not performed today due to patient having to leave early    UBE  6 Min Fwd/Rev   Pulleys  6 Min Flex/Abd   Corner Stretch     Cane Flexion on Wall   2 x 10    Cane Flexion off Wall   2 x 10    Cybex Rows - Close   X 10    Cybex Rows - Wide  X 10        Active Range of Motion :     Flexion   2 x 10     Abduction   2 x 10     External Rotation   2 x 10     IR- Wand behind back   2 x 10            Table Slides - Flex and Abd    Wall Slides - Flex and Abd   2 x 5 each with Min assist from therapist        Isometrics - all directions     Shoulder Ext/Scap Ret with band   2 x 10 with Blue Thera Tubing            Serratus Punches   2 x 10    Supine Cane Flexion   2 x 10            Tess received the following manual therapy techniques: Joint mobilizations, Manual traction and Myofacial release were applied to the: shoulder for 8 minutes, including:       PROM with End Range Stretch  X   Capsular Mobilizations   X   Scapular PNF     Deep Tissue/Myofascial                 Tess participated in neuromuscular re-education activities to improve: Balance, Coordination, Kinesthetic and Proprioception for 0 minutes. The following activities were included:      Patient received the following supervised modalities after being cleared for contradictions: Pre-Mod Electrical Stimulation:  Patient received Pre-Mod Electrical Stimulation for pain control applied to the Left Shoulder. Pt received stimulation at a frequency of  Hz for 0 minutes. Patient tolerated treatment well without any adverse effects.      MHP to left shoulder for 0 minutes       Home Exercises Provided and Patient Education Provided     Education provided: AVS    Written Home Exercises Provided: Patient instructed to cont prior HEP.  Exercises were reviewed and Tess was able to demonstrate them prior to the end of the session.  Tess demonstrated good  understanding of the education provided.     See EMR under Patient Instructions for exercises provided prior visit.    Assessment     Therapist upgraded patient to the Active Range of Motion phase at the last treatment session. Patient with increased soreness but is doing well. Instructed her on proper form for Active Flexion and Abduction. She presents with over-activation of the Upper Traps. Therapist placed patient in front of a mirror as well as providing verbal and tactile cues to correct this over-activation. Will continue to work to improve the scapulohumeral rhythm. Patient had an  appointment today and she had to leave early so today's session was cut short. Will continue with the current Plan of Care and work to restore her Active Range of Motion.         Measurements for 9/9/2022 are as follows :   Passive Shoulder Flexion: 150  Passive Shoulder Abduction: 142  Passive Shoulder IR: 45  Passive Shoulder ER: 70              P.M.H:  Tess is a 42 y.o. female referred to outpatient Physical Therapy with a medical diagnosis of Left RCR. Patient underwent RCR on 7/27/2022. She is here today for her Outpatient Physical Therapy Evaluation in an Abduction Wedge and Sling. She is to be Passive Range of Motion at this time. The therapist initiated a Home Exercise Program of Scap Ret, Pendulums, and Table Slides today. Therapist will progress patient per her MD protocol. Patient will require Physical Therapy intervention to address all deficits and work toward completion of all goals set.        Tess Is progressing well towards her goals.   Pt prognosis is Good.     Pt will continue to benefit from skilled outpatient physical therapy to address the deficits listed in the problem list box on initial evaluation, provide pt/family education and to maximize pt's level of independence in the home and community environment.     Pt's spiritual, cultural and educational needs considered and pt agreeable to plan of care and goals.     Anticipated barriers to physical therapy: None    Goals :   Short Term Goals : 4 weeks   Independent with Home Exercise Program - Goal Ongoing   Increase Left Shoulder Flexion and Abduction Passive Range of Motion to 120 degrees - Goal Met and Upgraded to Active Range of Motion   Increase Left Shoulder Passive Internal Rotation and External Rotation Range of Motion to 60 degrees  - Goal Partially Met and Upgraded to Active Range of Motion   Increase Left Shoulder Strength to 3/5 - Goal Not Met   Decrease complaints of Left Shoulder Pain to 4/10 with Activity - Goal Not Met       Long Term Goals : 12 weeks   1.Patient will perform 30 minutes of Activity with use of Left Shoulder with Pain Less than or Equal to 3/10 - Goal Not Met   2. Increase Active Range of Motion to Within Normal Limits - Goal Not Met   3. Increase Left Shoulder and Scapular Strength to 4/5 - Goal Not Met       Plan     Plan of care Certification: 8/8/2022 to 11/4/2022.     Outpatient Physical Therapy 2 times weekly for 12 weeks to include the following interventions: Electrical Stimulation to decrease pain and inflammation as needed, Manual Therapy, Moist Heat/ Ice, Neuromuscular Re-ed, Patient Education and Therapeutic Exercise.       SUJEY LLAMAS, PT, DPT   9/16/2022

## 2022-09-19 ENCOUNTER — CLINICAL SUPPORT (OUTPATIENT)
Dept: REHABILITATION | Facility: HOSPITAL | Age: 42
End: 2022-09-19
Payer: MEDICAID

## 2022-09-19 DIAGNOSIS — M25.512 PAIN AND SWELLING OF LEFT SHOULDER: ICD-10-CM

## 2022-09-19 DIAGNOSIS — M25.612 DECREASED RANGE OF MOTION OF LEFT SHOULDER: ICD-10-CM

## 2022-09-19 DIAGNOSIS — R29.898 WEAKNESS OF LEFT ARM: ICD-10-CM

## 2022-09-19 DIAGNOSIS — M25.412 PAIN AND SWELLING OF LEFT SHOULDER: ICD-10-CM

## 2022-09-19 DIAGNOSIS — M75.122 NONTRAUMATIC COMPLETE TEAR OF LEFT ROTATOR CUFF: Primary | ICD-10-CM

## 2022-09-19 PROCEDURE — 97110 THERAPEUTIC EXERCISES: CPT

## 2022-09-19 PROCEDURE — 97140 MANUAL THERAPY 1/> REGIONS: CPT

## 2022-09-19 NOTE — PROGRESS NOTES
[facility] Treatment Note     Name: Tess Capone  Clinic Number: [id number]    Visit Date: 9/19/2022  Therapy Diagnosis:            Encounter Diagnoses   Name Primary?    Strain of tendon of left rotator cuff      Complete rotator cuff tear or rupture of left shoulder, not specified as traumatic        Physician: Casi David PA-C    Ortho MD : Girish Norwood     Physician Orders: PT Eval and Treat   Medical Diagnosis from Referral: Left RCR  Evaluation Date: 8/8/2022  Date of Surgery : 7/25/2022  Updated Plan of Care Due : 10/8/2022  Authorization Period Expiration: 8/2/2023  Plan of Care Expiration: 11/4/2022    Visit # / Visits authorized: 12 / Unlimited   PTA Visit #: 2/5    Time In: 9:00 am  Time Out: 9:58 am   Total Billable Time: 55 minutes    Precautions: Standard    Prior Level of Function: Able to perform all ADLs and Activities Independently   Current Level of Function: Unable to use Left Upper Extremity at this time    Subjective     Pt reports: she had a good weekend. Shoulder is adjusting well to the new phase.   She was compliant with home exercise program.    Pain: 5/10  Location: left shoulder      Objective     Passive Shoulder Flexion: 150  Passive Shoulder Abduction: 142  Passive Shoulder IR: 45  Passive Shoulder ER: 70      Tess received therapeutic exercises to develop strength, endurance and ROM for 45 minutes including:       Shoulder Exercises   Exercises in red not performed today due to patient having to leave early    UBE  6 Min Fwd/Rev   Pulleys  6 Min Flex/Abd   Corner Stretch     Cane Flexion on Wall   2 x 10    Cane Flexion off Wall   2 x 10    Cybex Rows - Close   X 10    Cybex Rows - Wide  X 10        Active Range of Motion :     Flexion   2 x 10     Abduction   2 x 10     External Rotation   2 x 10     IR- Wand behind back   2 x 10            Table Slides - Flex and Abd    Wall Slides - Flex and Abd   2 x 5 each with Min assist from therapist        Isometrics - all directions     Shoulder Ext/Scap Ret with band   2 x 10 with Blue Thera Tubing            Serratus Punches   2 x 10    Supine Cane Flexion   2 x 10            Tess received the following manual therapy techniques: Joint mobilizations, Manual traction and Myofacial release were applied to the: shoulder for 10 minutes, including:       PROM with End Range Stretch  X   Capsular Mobilizations   X   Scapular PNF     Deep Tissue/Myofascial                 Tess participated in neuromuscular re-education activities to improve: Balance, Coordination, Kinesthetic and Proprioception for 0 minutes. The following activities were included:      Patient received the following supervised modalities after being cleared for contradictions: Pre-Mod Electrical Stimulation:  Patient received Pre-Mod Electrical Stimulation for pain control applied to the Left Shoulder. Pt received stimulation at a frequency of  Hz for 0 minutes. Patient tolerated treatment well without any adverse effects.      MHP to left shoulder for 0 minutes       Home Exercises Provided and Patient Education Provided     Education provided: AVS    Written Home Exercises Provided: Patient instructed to cont prior HEP.  Exercises were reviewed and Tess was able to demonstrate them prior to the end of the session.  Tess demonstrated good  understanding of the education provided.     See EMR under Patient Instructions for exercises provided prior visit.    Assessment     Shoulder is adjusting well to the new phase. Therapist working on restoring proper scapulohumeral rhythm. Patient is more aware of the trap over-activation now and tries to self-correct. She is now able to perform active flexion and abduction to approx 60-70 degrees without upper trap activation. Will continue to focus on improving Active Range of Motion.         Measurements for 9/9/2022 are as follows :   Passive Shoulder Flexion: 150  Passive Shoulder  Abduction: 142  Passive Shoulder IR: 45  Passive Shoulder ER: 70              P.M.H:  Tess is a 42 y.o. female referred to outpatient Physical Therapy with a medical diagnosis of Left RCR. Patient underwent RCR on 7/27/2022. She is here today for her Outpatient Physical Therapy Evaluation in an Abduction Wedge and Sling. She is to be Passive Range of Motion at this time. The therapist initiated a Home Exercise Program of Scap Ret, Pendulums, and Table Slides today. Therapist will progress patient per her MD protocol. Patient will require Physical Therapy intervention to address all deficits and work toward completion of all goals set.        Tess Is progressing well towards her goals.   Pt prognosis is Good.     Pt will continue to benefit from skilled outpatient physical therapy to address the deficits listed in the problem list box on initial evaluation, provide pt/family education and to maximize pt's level of independence in the home and community environment.     Pt's spiritual, cultural and educational needs considered and pt agreeable to plan of care and goals.     Anticipated barriers to physical therapy: None    Goals :   Short Term Goals : 4 weeks   Independent with Home Exercise Program - Goal Ongoing   Increase Left Shoulder Flexion and Abduction Passive Range of Motion to 120 degrees - Goal Met and Upgraded to Active Range of Motion   Increase Left Shoulder Passive Internal Rotation and External Rotation Range of Motion to 60 degrees  - Goal Partially Met and Upgraded to Active Range of Motion   Increase Left Shoulder Strength to 3/5 - Goal Not Met   Decrease complaints of Left Shoulder Pain to 4/10 with Activity - Goal Not Met      Long Term Goals : 12 weeks   1.Patient will perform 30 minutes of Activity with use of Left Shoulder with Pain Less than or Equal to 3/10 - Goal Not Met   2. Increase Active Range of Motion to Within Normal Limits - Goal Not Met   3. Increase Left Shoulder and  Scapular Strength to 4/5 - Goal Not Met       Plan     Plan of care Certification: 8/8/2022 to 11/4/2022.     Outpatient Physical Therapy 2 times weekly for 12 weeks to include the following interventions: Electrical Stimulation to decrease pain and inflammation as needed, Manual Therapy, Moist Heat/ Ice, Neuromuscular Re-ed, Patient Education and Therapeutic Exercise.       SJUEY LLAMAS, PT, DPT   9/19/2022

## 2022-09-27 ENCOUNTER — CLINICAL SUPPORT (OUTPATIENT)
Dept: REHABILITATION | Facility: HOSPITAL | Age: 42
End: 2022-09-27
Payer: MEDICAID

## 2022-09-27 DIAGNOSIS — R29.898 WEAKNESS OF LEFT ARM: ICD-10-CM

## 2022-09-27 DIAGNOSIS — M25.412 PAIN AND SWELLING OF LEFT SHOULDER: ICD-10-CM

## 2022-09-27 DIAGNOSIS — M75.122 NONTRAUMATIC COMPLETE TEAR OF LEFT ROTATOR CUFF: Primary | ICD-10-CM

## 2022-09-27 DIAGNOSIS — M25.612 DECREASED RANGE OF MOTION OF LEFT SHOULDER: ICD-10-CM

## 2022-09-27 DIAGNOSIS — M25.512 PAIN AND SWELLING OF LEFT SHOULDER: ICD-10-CM

## 2022-09-27 PROCEDURE — 97140 MANUAL THERAPY 1/> REGIONS: CPT | Mod: CQ

## 2022-09-27 PROCEDURE — 97110 THERAPEUTIC EXERCISES: CPT | Mod: CQ

## 2022-09-27 NOTE — PROGRESS NOTES
[facility] Treatment Note     Name: Tess Capone  Clinic Number: [id number]    Visit Date: 9/27/2022  Therapy Diagnosis:            Encounter Diagnoses   Name Primary?    Strain of tendon of left rotator cuff      Complete rotator cuff tear or rupture of left shoulder, not specified as traumatic        Physician: Casi David PA-C  Ortho MD : Girish Norwood     Physician Orders: PT Eval and Treat   Medical Diagnosis from Referral: Left RCR  Evaluation Date: 8/8/2022  Date of Surgery : 7/25/2022  Updated Plan of Care Due : 10/8/2022  Authorization Period Expiration: 8/2/2023  Plan of Care Expiration: 11/4/2022    Visit # / Visits authorized: 13 / Unlimited   PTA Visit #: 1/5    Time In: 10:00 am  Time Out: 10:45 am   Total Billable Time: 45 minutes    Precautions: Standard    Prior Level of Function: Able to perform all ADLs and Activities Independently   Current Level of Function: Unable to use Left Upper Extremity at this time    Subjective     Pt reports: she feels better but has some stiffness present with a little soreness. She's been doing her stretches at home.  She was compliant with home exercise program.    Pain: 6/10  Location: left shoulder      Objective     Passive Shoulder Flexion: 150  Passive Shoulder Abduction: 142  Passive Shoulder IR: 45  Passive Shoulder ER: 70      Tess received therapeutic exercises to develop strength, endurance and ROM for 30 minutes including:       Shoulder Exercises   Exercises in red not performed today 9/27    UBE  6 Min Fwd/Rev   Pulleys  6 Min Flex/Abd   Left UT stretch  3 x 15 second hold (added due to over activation of UT with flexion)   Cane Flexion on Wall   2 x 10    Cane Flexion off Wall   2 x 10    Cybex Rows - Close   X 10    Cybex Rows - Wide  X 10        Active Range of Motion : With minutes assistance     Flexion   2 x 10     Abduction   2 x 10     External Rotation   2 x 10     IR- Wand behind back   2 x 10            Table  Slides - Flex and Abd    Wall Slides - Flex and abduction together  20x  with Min assist from therapist (eccentric control)        Isometrics - all directions     Shoulder Ext/Scap Ret with band   2 x 10 with Blue Thera Tubing            Serratus Punches   2 x 10    Supine Cane Flexion   2 x 10            Tess received the following manual therapy techniques: Joint mobilizations, Manual traction and Myofacial release were applied to the: shoulder for 10 minutes, including:       PROM with End Range Stretch  X   Capsular Mobilizations   X   Scapular PNF     Deep Tissue/Myofascial                 Tess participated in neuromuscular re-education activities to improve: Balance, Coordination, Kinesthetic and Proprioception for 0 minutes. The following activities were included:      Patient received the following supervised modalities after being cleared for contradictions: Pre-Mod Electrical Stimulation:  Patient received Pre-Mod Electrical Stimulation for pain control applied to the Left Shoulder. Pt received stimulation at a frequency of  Hz for 0 minutes. Patient tolerated treatment well without any adverse effects.      MHP to left shoulder for 0 minutes       Home Exercises Provided and Patient Education Provided     Education provided: AVS    Written Home Exercises Provided: Patient instructed to cont prior HEP.  Exercises were reviewed and Tess was able to demonstrate them prior to the end of the session.  Tess demonstrated good  understanding of the education provided.     See EMR under Patient Instructions for exercises provided prior visit.    Assessment     Shoulder is adjusting well to the new phase. Therapist working on restoring proper scapulohumeral rhythm. Patient is more aware of the trap over-activation now and tries to self-correct. She is now able to perform active flexion and abduction to approx 60-70 degrees without upper trap activation. Instructed pt in UT stretch to prevent  p! And stiffness from occurring and inhibiting ROM. Will continue to focus on improving Active Range of Motion.         Measurements for 9/9/2022 are as follows :   Passive Shoulder Flexion: 150  Passive Shoulder Abduction: 142  Passive Shoulder IR: 45  Passive Shoulder ER: 70              P.M.H:  Tess is a 42 y.o. female referred to outpatient Physical Therapy with a medical diagnosis of Left RCR. Patient underwent RCR on 7/27/2022. She is here today for her Outpatient Physical Therapy Evaluation in an Abduction Wedge and Sling. She is to be Passive Range of Motion at this time. The therapist initiated a Home Exercise Program of Scap Ret, Pendulums, and Table Slides today. Therapist will progress patient per her MD protocol. Patient will require Physical Therapy intervention to address all deficits and work toward completion of all goals set.        Tess Is progressing well towards her goals.   Pt prognosis is Good.     Pt will continue to benefit from skilled outpatient physical therapy to address the deficits listed in the problem list box on initial evaluation, provide pt/family education and to maximize pt's level of independence in the home and community environment.     Pt's spiritual, cultural and educational needs considered and pt agreeable to plan of care and goals.     Anticipated barriers to physical therapy: None    Goals :   Short Term Goals : 4 weeks   Independent with Home Exercise Program - Goal Ongoing   Increase Left Shoulder Flexion and Abduction Passive Range of Motion to 120 degrees - Goal Met and Upgraded to Active Range of Motion   Increase Left Shoulder Passive Internal Rotation and External Rotation Range of Motion to 60 degrees  - Goal Partially Met and Upgraded to Active Range of Motion   Increase Left Shoulder Strength to 3/5 - Goal Not Met   Decrease complaints of Left Shoulder Pain to 4/10 with Activity - Goal Not Met      Long Term Goals : 12 weeks   1.Patient will perform 30  minutes of Activity with use of Left Shoulder with Pain Less than or Equal to 3/10 - Goal Not Met   2. Increase Active Range of Motion to Within Normal Limits - Goal Not Met   3. Increase Left Shoulder and Scapular Strength to 4/5 - Goal Not Met       Plan     Plan of care Certification: 8/8/2022 to 11/4/2022.     Outpatient Physical Therapy 2 times weekly for 12 weeks to include the following interventions: Electrical Stimulation to decrease pain and inflammation as needed, Manual Therapy, Moist Heat/ Ice, Neuromuscular Re-ed, Patient Education and Therapeutic Exercise.       Jameson Burns, PTA  9/27/2022

## 2022-09-29 ENCOUNTER — CLINICAL SUPPORT (OUTPATIENT)
Dept: REHABILITATION | Facility: HOSPITAL | Age: 42
End: 2022-09-29
Payer: MEDICAID

## 2022-09-29 DIAGNOSIS — R29.898 WEAKNESS OF LEFT ARM: ICD-10-CM

## 2022-09-29 DIAGNOSIS — M25.612 DECREASED RANGE OF MOTION OF LEFT SHOULDER: ICD-10-CM

## 2022-09-29 DIAGNOSIS — M75.122 NONTRAUMATIC COMPLETE TEAR OF LEFT ROTATOR CUFF: Primary | ICD-10-CM

## 2022-09-29 DIAGNOSIS — M25.512 PAIN AND SWELLING OF LEFT SHOULDER: ICD-10-CM

## 2022-09-29 DIAGNOSIS — M25.412 PAIN AND SWELLING OF LEFT SHOULDER: ICD-10-CM

## 2022-09-29 PROCEDURE — 97014 ELECTRIC STIMULATION THERAPY: CPT | Mod: CQ

## 2022-09-29 PROCEDURE — 97110 THERAPEUTIC EXERCISES: CPT | Mod: CQ

## 2022-09-29 PROCEDURE — 97140 MANUAL THERAPY 1/> REGIONS: CPT | Mod: CQ

## 2022-09-29 NOTE — PROGRESS NOTES
[facility] Treatment Note     Name: Tess Capone  Clinic Number: [id number]    Visit Date: 9/29/2022  Therapy Diagnosis:            Encounter Diagnoses   Name Primary?    Strain of tendon of left rotator cuff      Complete rotator cuff tear or rupture of left shoulder, not specified as traumatic        Physician: Casi David PA-C  Ortho MD : Girish Norwood     Physician Orders: PT Eval and Treat   Medical Diagnosis from Referral: Left RCR  Evaluation Date: 8/8/2022  Date of Surgery : 7/25/2022  Updated Plan of Care Due : 10/8/2022  Authorization Period Expiration: 8/2/2023  Plan of Care Expiration: 11/4/2022    Visit # / Visits authorized: 13 / Unlimited   PTA Visit #: 1/5    Time In: 10:00 am  Time Out: 10:46 am   Total Billable Time: 45 minutes    Precautions: Standard    Prior Level of Function: Able to perform all ADLs and Activities Independently   Current Level of Function: Unable to use Left Upper Extremity at this time    Subjective     Pt reports: she fell last night when one of her grand kids was trying to get in the shower with her. She fell on her left arm.      She was compliant with home exercise program.    Pain: 8-9/10  Location: left shoulder      Objective     Passive Shoulder Flexion: 150  Passive Shoulder Abduction: 142  Passive Shoulder IR: 45  Passive Shoulder ER: 70    Tess received therapeutic exercises to develop strength, endurance and ROM for 15 minutes including:       Shoulder Exercises   Exercises in red not performed today 9/29 due to pt's level of pain upon arrival from falling.    UBE  6 Min Fwd/Rev   Pulleys  6 Min Flex/Abd   Left UT stretch  3 x 15 second hold (added due to over activation of UT with flexion)   Cane Flexion on Wall   2 x 10    Cane Flexion off Wall   2 x 10    Cybex Rows - Close   X 10    Cybex Rows - Wide  X 10        Active Range of Motion : With assistance     Flexion   2 x 10     Abduction   2 x 10     External Rotation   2 x 10      IR- Wand behind back   2 x 10            Table Slides - Flex and Abd    Wall Slides - Flex and abduction together  20x  with Min assist from therapist (eccentric control)        Isometrics - all directions     Shoulder Ext/Scap Ret with band   2 x 10 with Blue Thera Tubing            Serratus Punches   2 x 10    Supine Cane Flexion   2 x 10            Tess received the following manual therapy techniques: Joint mobilizations, Manual traction and Myofacial release were applied to the: shoulder for 15 minutes, including:       PROM with End Range Stretch  X   Capsular Mobilizations   X   Scapular PNF     Deep Tissue/Myofascial               Tess participated in neuromuscular re-education activities to improve: Balance, Coordination, Kinesthetic and Proprioception for 0 minutes. The following activities were included:      Patient received the following supervised modalities after being cleared for contradictions: Pre-Mod Electrical Stimulation:  Patient received Pre-Mod Electrical Stimulation for pain control applied to the Left Shoulder. Pt received stimulation at a frequency of  Hz for 10 minutes. Patient tolerated treatment well without any adverse effects.      MHP to left shoulder for 10 minutes       Home Exercises Provided and Patient Education Provided     Education provided: AVS    Written Home Exercises Provided: Patient instructed to cont prior HEP.  Exercises were reviewed and Tess was able to demonstrate them prior to the end of the session.  Patriciarojelio demonstrated good  understanding of the education provided.     See EMR under Patient Instructions for exercises provided prior visit.    Assessment     Case conference with Phyllis Brandt, PT, DPT during today's treatment due to pt reporting falling yesterday getting out of the shower. Supervising therapist was notified and attended assessment with pt. Today, some exercises were omitted due to p! Control and decreasing discomfort. Premod  administered with MHP at conclusion of tx after manual stretching. Will assess pt's pain at next visit when she returns and progress as able.       Measurements for 9/9/2022 are as follows :   Passive Shoulder Flexion: 150  Passive Shoulder Abduction: 142  Passive Shoulder IR: 45  Passive Shoulder ER: 70        P.M.H:  Tess is a 42 y.o. female referred to outpatient Physical Therapy with a medical diagnosis of Left RCR. Patient underwent RCR on 7/27/2022. She is here today for her Outpatient Physical Therapy Evaluation in an Abduction Wedge and Sling. She is to be Passive Range of Motion at this time. The therapist initiated a Home Exercise Program of Scap Ret, Pendulums, and Table Slides today. Therapist will progress patient per her MD protocol. Patient will require Physical Therapy intervention to address all deficits and work toward completion of all goals set.        Tess Is progressing well towards her goals.   Pt prognosis is Good.     Pt will continue to benefit from skilled outpatient physical therapy to address the deficits listed in the problem list box on initial evaluation, provide pt/family education and to maximize pt's level of independence in the home and community environment.     Pt's spiritual, cultural and educational needs considered and pt agreeable to plan of care and goals.     Anticipated barriers to physical therapy: None    Goals :   Short Term Goals : 4 weeks   Independent with Home Exercise Program - Goal Ongoing   Increase Left Shoulder Flexion and Abduction Passive Range of Motion to 120 degrees - Goal Met and Upgraded to Active Range of Motion   Increase Left Shoulder Passive Internal Rotation and External Rotation Range of Motion to 60 degrees  - Goal Partially Met and Upgraded to Active Range of Motion   Increase Left Shoulder Strength to 3/5 - Goal Not Met   Decrease complaints of Left Shoulder Pain to 4/10 with Activity - Goal Not Met      Long Term Goals : 12 weeks    1.Patient will perform 30 minutes of Activity with use of Left Shoulder with Pain Less than or Equal to 3/10 - Goal Not Met   2. Increase Active Range of Motion to Within Normal Limits - Goal Not Met   3. Increase Left Shoulder and Scapular Strength to 4/5 - Goal Not Met       Plan     Plan of care Certification: 8/8/2022 to 11/4/2022.     Outpatient Physical Therapy 2 times weekly for 12 weeks to include the following interventions: Electrical Stimulation to decrease pain and inflammation as needed, Manual Therapy, Moist Heat/ Ice, Neuromuscular Re-ed, Patient Education and Therapeutic Exercise.       Jameson Burns, PTA  9/29/2022

## 2022-10-04 ENCOUNTER — CLINICAL SUPPORT (OUTPATIENT)
Dept: REHABILITATION | Facility: HOSPITAL | Age: 42
End: 2022-10-04
Payer: MEDICAID

## 2022-10-04 DIAGNOSIS — M25.412 PAIN AND SWELLING OF LEFT SHOULDER: ICD-10-CM

## 2022-10-04 DIAGNOSIS — R29.898 WEAKNESS OF LEFT ARM: ICD-10-CM

## 2022-10-04 DIAGNOSIS — M25.612 DECREASED RANGE OF MOTION OF LEFT SHOULDER: ICD-10-CM

## 2022-10-04 DIAGNOSIS — M25.512 PAIN AND SWELLING OF LEFT SHOULDER: ICD-10-CM

## 2022-10-04 DIAGNOSIS — M75.122 NONTRAUMATIC COMPLETE TEAR OF LEFT ROTATOR CUFF: Primary | ICD-10-CM

## 2022-10-04 PROCEDURE — 97110 THERAPEUTIC EXERCISES: CPT

## 2022-10-04 PROCEDURE — 97140 MANUAL THERAPY 1/> REGIONS: CPT

## 2022-10-04 NOTE — PROGRESS NOTES
[facility] Treatment Note     Name: Tess Capone  Clinic Number: [id number]    Visit Date: 10/4/2022  Therapy Diagnosis:            Encounter Diagnoses   Name Primary?    Strain of tendon of left rotator cuff      Complete rotator cuff tear or rupture of left shoulder, not specified as traumatic        Physician: Casi David PA-C  Ortho MD : Girish Norwood     Physician Orders: PT Eval and Treat   Medical Diagnosis from Referral: Left RCR  Evaluation Date: 8/8/2022  Date of Surgery : 7/25/2022  Updated Plan of Care Due : 10/8/2022  Authorization Period Expiration: 8/2/2023  Plan of Care Expiration: 11/4/2022    Visit # / Visits authorized: 15 / Unlimited   PTA Visit #: 1/5    Time In: 10:00 am  Time Out: 11:00 am   Total Billable Time: 60 minutes    Precautions: Standard    Prior Level of Function: Able to perform all ADLs and Activities Independently   Current Level of Function: Unable to use Left Upper Extremity at this time    Subjective     Pt reports:  feeling better today    She was compliant with home exercise program.    Pain: 5/10  Location: left shoulder      Objective     Passive Shoulder Flexion: 150  Passive Shoulder Abduction: 142  Passive Shoulder IR: 45  Passive Shoulder ER: 70    Tess received therapeutic exercises to develop strength, endurance and ROM for 45 minutes including:       Shoulder Exercises       UBE  6 Min Fwd/Rev   Pulleys  6 Min Flex/Abd   Left UT stretch  3 x 15 second hold (added due to over activation of UT with flexion)   Cane Flexion on Wall   2 x 10    Cane Flexion off Wall   2 x 10    Cybex Rows - Close   2 X 10 3 plates   Cybex Rows - Wide  2 X 10 3 plates       Active Range of Motion : With assistance     Flexion   x 10 yellow TB    Abduction   x 10 yellow TB    External Rotation   2 x 10     IR- Wand behind back   2 x 10    Shoulder 4-way  3 x 10 red TB       Table Slides - Flex and Abd    Wall Slides - Flex and abduction together  Finger  ladder  20x  with Min assist from therapist (eccentric control)        Isometrics - all directions     Shoulder Ext/Scap Ret with band   2 x 10 with Blue Thera Tubing    Horizontal abduction  2 x 10 yellow TB   Bilateral external rotation  2 x 10 yellow TB   Serratus Punches   2 x 10    Supine Cane Flexion   2 x 10            Tess received the following manual therapy techniques: Joint mobilizations, Manual traction and Myofacial release were applied to the: shoulder for 15 minutes, including:       PROM with End Range Stretch  X   Capsular Mobilizations   X   Scapular PNF     Deep Tissue/Myofascial               Tess participated in neuromuscular re-education activities to improve: Balance, Coordination, Kinesthetic and Proprioception for 0 minutes. The following activities were included:      Patient received the following supervised modalities after being cleared for contradictions: Pre-Mod Electrical Stimulation:  Patient received Pre-Mod Electrical Stimulation for pain control applied to the Left Shoulder. Pt received stimulation at a frequency of  Hz for 0 minutes. Patient tolerated treatment well without any adverse effects.      MHP to left shoulder for 0 minutes       Home Exercises Provided and Patient Education Provided     Education provided: AVS    Written Home Exercises Provided: Patient instructed to cont prior HEP.  Exercises were reviewed and Tess was able to demonstrate them prior to the end of the session.  Tess demonstrated good  understanding of the education provided.     See EMR under Patient Instructions for exercises provided prior visit.    Assessment     Continue to work with patient to eliminate upper trap over activation causing hiking. Patient instructed to work at home with mirror. Added resistive exercise with theraband today. Patient has difficulty activating anterior deltoid. Patient tolerated new exercises well. Will continue to advance as  tolerated.      Measurements for 9/9/2022 are as follows :   Passive Shoulder Flexion: 150  Passive Shoulder Abduction: 142  Passive Shoulder IR: 45  Passive Shoulder ER: 70        P.M.H:  Tess is a 42 y.o. female referred to outpatient Physical Therapy with a medical diagnosis of Left RCR. Patient underwent RCR on 7/27/2022. She is here today for her Outpatient Physical Therapy Evaluation in an Abduction Wedge and Sling. She is to be Passive Range of Motion at this time. The therapist initiated a Home Exercise Program of Scap Ret, Pendulums, and Table Slides today. Therapist will progress patient per her MD protocol. Patient will require Physical Therapy intervention to address all deficits and work toward completion of all goals set.        Tess Is progressing well towards her goals.   Pt prognosis is Good.     Pt will continue to benefit from skilled outpatient physical therapy to address the deficits listed in the problem list box on initial evaluation, provide pt/family education and to maximize pt's level of independence in the home and community environment.     Pt's spiritual, cultural and educational needs considered and pt agreeable to plan of care and goals.     Anticipated barriers to physical therapy: None    Goals :   Short Term Goals : 4 weeks   Independent with Home Exercise Program - Goal Ongoing   Increase Left Shoulder Flexion and Abduction Passive Range of Motion to 120 degrees - Goal Met and Upgraded to Active Range of Motion   Increase Left Shoulder Passive Internal Rotation and External Rotation Range of Motion to 60 degrees  - Goal Partially Met and Upgraded to Active Range of Motion   Increase Left Shoulder Strength to 3/5 - Goal Not Met   Decrease complaints of Left Shoulder Pain to 4/10 with Activity - Goal Not Met      Long Term Goals : 12 weeks   1.Patient will perform 30 minutes of Activity with use of Left Shoulder with Pain Less than or Equal to 3/10 - Goal Not Met   2.  Increase Active Range of Motion to Within Normal Limits - Goal Not Met   3. Increase Left Shoulder and Scapular Strength to 4/5 - Goal Not Met       Plan     Plan of care Certification: 8/8/2022 to 11/4/2022.     Outpatient Physical Therapy 2 times weekly for 12 weeks to include the following interventions: Electrical Stimulation to decrease pain and inflammation as needed, Manual Therapy, Moist Heat/ Ice, Neuromuscular Re-ed, Patient Education and Therapeutic Exercise.       RANDI LLAMAS, PT, MLT    10/4/2022

## 2022-10-06 ENCOUNTER — CLINICAL SUPPORT (OUTPATIENT)
Dept: REHABILITATION | Facility: HOSPITAL | Age: 42
End: 2022-10-06
Payer: MEDICAID

## 2022-10-06 DIAGNOSIS — M25.612 DECREASED RANGE OF MOTION OF LEFT SHOULDER: ICD-10-CM

## 2022-10-06 DIAGNOSIS — M25.512 PAIN AND SWELLING OF LEFT SHOULDER: ICD-10-CM

## 2022-10-06 DIAGNOSIS — R29.898 WEAKNESS OF LEFT ARM: ICD-10-CM

## 2022-10-06 DIAGNOSIS — M75.122 NONTRAUMATIC COMPLETE TEAR OF LEFT ROTATOR CUFF: Primary | ICD-10-CM

## 2022-10-06 DIAGNOSIS — M25.412 PAIN AND SWELLING OF LEFT SHOULDER: ICD-10-CM

## 2022-10-06 PROCEDURE — 97110 THERAPEUTIC EXERCISES: CPT

## 2022-10-06 PROCEDURE — 97140 MANUAL THERAPY 1/> REGIONS: CPT

## 2022-10-06 PROCEDURE — 97014 ELECTRIC STIMULATION THERAPY: CPT

## 2022-10-06 NOTE — PLAN OF CARE
[facility] Updated Plan of Care      Name: Tess Capone  Clinic Number: [id number]    Visit Date: 10/6/2022  Therapy Diagnosis:            Encounter Diagnoses   Name Primary?    Strain of tendon of left rotator cuff      Complete rotator cuff tear or rupture of left shoulder, not specified as traumatic        Physician: Casi David PA-C  Ortho MD : Girish Norwood     Physician Orders: PT Eval and Treat   Medical Diagnosis from Referral: Left RCR  Evaluation Date: 8/8/2022  Date of Surgery : 7/25/2022  Updated Plan of Care Due : 11/5/2022  Authorization Period Expiration: 8/2/2023  Plan of Care Expiration: 12/2/2022    Visit # / Visits authorized: 16 / Unlimited   PTA Visit #: 1/5    Time In: 10:00 am  Time Out: 11:00 am   Total Billable Time: 60 minutes    Precautions: Standard    Prior Level of Function: Able to perform all ADLs and Activities Independently   Current Level of Function: Unable to use Left Upper Extremity at this time    Subjective     Pt reports:  Left shoulder is still sore after her fall, but is a little better     She was compliant with home exercise program.    Pain: 5/10  Location: left shoulder      Objective     Passive Shoulder Flexion: 150  Passive Shoulder Abduction: 145  Passive Shoulder IR: 48  Passive Shoulder ER: 70  Active Shoulder Flexion: 140  Active Shoulder Abduction: 130  Active Shoulder IR: L1  Active Shoulder ER: 70    Tess received therapeutic exercises to develop strength, endurance and ROM for 30 minutes including:       Shoulder Exercises       UBE  6 Min Fwd/Rev   Pulleys  6 Min Flex/Abd   Left UT stretch  3 x 15 second hold (added due to over activation of UT with flexion)   Cane Flexion on Wall   2 x 10    Cane Flexion off Wall   2 x 10    Cybex Rows - Close   2 X 10 3 plates   Cybex Rows - Wide  2 X 10 3 plates       Active Range of Motion : With assistance     Flexion   x 10 yellow TB    Abduction   x 10 yellow TB    External Rotation   2  x 10     IR- Wand behind back   2 x 10    Shoulder 4-way  3 x 10 red TB       Table Slides - Flex and Abd    Wall Slides - Flex and abduction together  Finger ladder  20x  with Min assist from therapist (eccentric control)        Isometrics - all directions     Shoulder Ext/Scap Ret with band   2 x 10 with Blue Thera Tubing    Horizontal abduction  2 x 10 yellow TB   Bilateral external rotation  2 x 10 yellow TB   Serratus Punches   2 x 10    Supine Cane Flexion   2 x 10            Tess received the following manual therapy techniques: Joint mobilizations, Manual traction and Myofacial release were applied to the: shoulder for 15 minutes, including:       PROM with End Range Stretch  X   Capsular Mobilizations   X   Scapular PNF     Deep Tissue/Myofascial               Tess participated in neuromuscular re-education activities to improve: Balance, Coordination, Kinesthetic and Proprioception for 0 minutes. The following activities were included:      Patient received the following supervised modalities after being cleared for contradictions: Pre-Mod Electrical Stimulation:  Patient received Pre-Mod Electrical Stimulation for pain control applied to the Left Shoulder. Pt received stimulation at a frequency of  Hz for 15 minutes. Patient tolerated treatment well without any adverse effects.      CP to left shoulder for 15 minutes       Home Exercises Provided and Patient Education Provided     Education provided: AVS    Written Home Exercises Provided: Patient instructed to cont prior HEP.  Exercises were reviewed and Patriciarojelio was able to demonstrate them prior to the end of the session.  Tess demonstrated good  understanding of the education provided.     See EMR under Patient Instructions for exercises provided prior visit.    Assessment       Patient underwent RCR on 7/27/2022. She is now 10 weeks Post Op. She has been in Physical Therapy for 8 weeks and 16 visits. She has done well with the  Passive and Active Phases of the protocol and has now moved on the resistance phase. She was doing quite well, but then Patient fell on 9/28/2022. She described the fall as slipping out of the shower and landing on her Left shoulder. She denies trying to catch herself with that hand. Therapist assessed her shoulder on 9/29/2022. Supraspinatus appears to be intact just very sore. Therapist has gently added light resistance to her exercises as tolerated. She complains of pain with resisted flexion but has no pain with resisted IR or ER. Therapist is performing Pre-Mod and Ice at end of sessions to decrease pain and inflammation as able. Feel she has flared the supraspinatus tendon up and we will keep a close eye on this. Patient will require continued Physical Therapy intervention to work safely through the protocol. Sup Visit performed today with SAMIRA Melchor and SAMIRA Gonzalez.  All goals and treatment plan reviewed. Will work toward completion of all goals set.   Measurements for 10/6/2022 are as follows :     Passive Shoulder Flexion: 150  Passive Shoulder Abduction: 145  Passive Shoulder IR: 48  Passive Shoulder ER: 70  Active Shoulder Flexion: 140  Active Shoulder Abduction: 130  Active Shoulder IR: L1  Active Shoulder ER: 70          P.M.H:  Tess is a 42 y.o. female referred to outpatient Physical Therapy with a medical diagnosis of Left RCR. Patient underwent RCR on 7/27/2022. She is here today for her Outpatient Physical Therapy Evaluation in an Abduction Wedge and Sling. She is to be Passive Range of Motion at this time. The therapist initiated a Home Exercise Program of Scap Ret, Pendulums, and Table Slides today. Therapist will progress patient per her MD protocol. Patient will require Physical Therapy intervention to address all deficits and work toward completion of all goals set.        Tess Is progressing well towards her goals.   Pt prognosis is Good.     Pt's spiritual, cultural  and educational needs considered and pt agreeable to plan of care and goals.     Anticipated barriers to physical therapy: None    Goals :   Short Term Goals : 4 weeks   Independent with Home Exercise Program - Goal Ongoing   Increase Left Shoulder Flexion and Abduction Passive Range of Motion to 120 degrees - Goal Met and Upgraded to Active Range of Motion   Increase Left Shoulder Passive Internal Rotation and External Rotation Range of Motion to 60 degrees  - Goal Partially Met and Upgraded to Active Range of Motion   Increase Left Shoulder Strength to 3/5 - Goal Met   Decrease complaints of Left Shoulder Pain to 4/10 with Activity - Goal Not Met      Long Term Goals : 12 weeks   1.Patient will perform 30 minutes of Activity with use of Left Shoulder with Pain Less than or Equal to 3/10 - Goal Not Met   2. Increase Active Range of Motion to Within Normal Limits - Goal Not Met   3. Increase Left Shoulder and Scapular Strength to 4/5 - Goal Not Met     Reasons for Recertification of Therapy: Pt will continue to benefit from skilled outpatient physical therapy to address the deficits listed in the problem list box on initial evaluation, provide pt/family education and to maximize pt's level of independence in the home and community environment.     Plan     Updated Certification Period: 10/6/2022 to 12/2/2022  Recommended Treatment Plan: 2 times per week for 8 weeks: Electrical Stimulation to decrease pain and inflammation as needed, Manual Therapy, Moist Heat/ Ice, Neuromuscular Re-ed, Patient Education, and Therapeutic Exercise      SUJEY LLAMAS, PT, DPT   10/6/2022      I CERTIFY THE NEED FOR THESE SERVICES FURNISHED UNDER THIS PLAN OF TREATMENT AND WHILE UNDER MY CARE.    Physician's comments:      Physician's Signature: ___________________________________________________

## 2022-10-06 NOTE — PROGRESS NOTES
[facility] Treatment Note     Name: Tess Capone  Clinic Number: [id number]    Visit Date: 10/6/2022  Therapy Diagnosis:            Encounter Diagnoses   Name Primary?    Strain of tendon of left rotator cuff      Complete rotator cuff tear or rupture of left shoulder, not specified as traumatic        Physician: Casi David PA-C  Ortho MD : Girish Norwood     Physician Orders: PT Eval and Treat   Medical Diagnosis from Referral: Left RCR  Evaluation Date: 8/8/2022  Date of Surgery : 7/25/2022  Updated Plan of Care Due : 11/5/2022  Authorization Period Expiration: 8/2/2023  Plan of Care Expiration: 12/2/2022    Visit # / Visits authorized: 16 / Unlimited   PTA Visit #: 1/5    Time In: 10:00 am  Time Out: 11:00 am   Total Billable Time: 60 minutes    Precautions: Standard    Prior Level of Function: Able to perform all ADLs and Activities Independently   Current Level of Function: Unable to use Left Upper Extremity at this time    Subjective     Pt reports:  Left shoulder is still sore after her fall, but is a little better     She was compliant with home exercise program.    Pain: 5/10  Location: left shoulder      Objective     Passive Shoulder Flexion: 150  Passive Shoulder Abduction: 145  Passive Shoulder IR: 48  Passive Shoulder ER: 70  Active Shoulder Flexion: 140  Active Shoulder Abduction: 130  Active Shoulder IR: L1  Active Shoulder ER: 70    Tess received therapeutic exercises to develop strength, endurance and ROM for 30 minutes including:       Shoulder Exercises       UBE  6 Min Fwd/Rev   Pulleys  6 Min Flex/Abd   Left UT stretch  3 x 15 second hold (added due to over activation of UT with flexion)   Cane Flexion on Wall   2 x 10    Cane Flexion off Wall   2 x 10    Cybex Rows - Close   2 X 10 3 plates   Cybex Rows - Wide  2 X 10 3 plates       Active Range of Motion : With assistance     Flexion   x 10 yellow TB    Abduction   x 10 yellow TB    External Rotation   2 x 10      IR- Wand behind back   2 x 10    Shoulder 4-way  3 x 10 red TB       Table Slides - Flex and Abd    Wall Slides - Flex and abduction together  Finger ladder  20x  with Min assist from therapist (eccentric control)        Isometrics - all directions     Shoulder Ext/Scap Ret with band   2 x 10 with Blue Thera Tubing    Horizontal abduction  2 x 10 yellow TB   Bilateral external rotation  2 x 10 yellow TB   Serratus Punches   2 x 10    Supine Cane Flexion   2 x 10            Tess received the following manual therapy techniques: Joint mobilizations, Manual traction and Myofacial release were applied to the: shoulder for 15 minutes, including:       PROM with End Range Stretch  X   Capsular Mobilizations   X   Scapular PNF     Deep Tissue/Myofascial               Tess participated in neuromuscular re-education activities to improve: Balance, Coordination, Kinesthetic and Proprioception for 0 minutes. The following activities were included:      Patient received the following supervised modalities after being cleared for contradictions: Pre-Mod Electrical Stimulation:  Patient received Pre-Mod Electrical Stimulation for pain control applied to the Left Shoulder. Pt received stimulation at a frequency of  Hz for 15 minutes. Patient tolerated treatment well without any adverse effects.      CP to left shoulder for 15 minutes       Home Exercises Provided and Patient Education Provided     Education provided: AVS    Written Home Exercises Provided: Patient instructed to cont prior HEP.  Exercises were reviewed and Waynehelenrojelio was able to demonstrate them prior to the end of the session.  Tess demonstrated good  understanding of the education provided.     See EMR under Patient Instructions for exercises provided prior visit.    Assessment       Patient underwent RCR on 7/27/2022. She is now 10 weeks Post Op. She has been in Physical Therapy for 8 weeks and 16 visits. She has done well with the Passive and  Active Phases of the protocol and has now moved on the resistance phase. She was doing quite well, but then Patient fell on 9/28/2022. She described the fall as slipping out of the shower and landing on her Left shoulder. She denies trying to catch herself with that hand. Therapist assessed her shoulder on 9/29/2022. Supraspinatus appears to be intact just very sore. Therapist has gently added light resistance to her exercises as tolerated. She complains of pain with resisted flexion but has no pain with resisted IR or ER. Therapist is performing Pre-Mod and Ice at end of sessions to decrease pain and inflammation as able. Feel she has flared the supraspinatus tendon up and we will keep a close eye on this. Patient will require continued Physical Therapy intervention to work safely through the protocol. Sup Visit performed today with SAMIRA Melchor and SAMIRA Gonzalez.  All goals and treatment plan reviewed. Will work toward completion of all goals set.   Measurements for 10/6/2022 are as follows :     Passive Shoulder Flexion: 150  Passive Shoulder Abduction: 145  Passive Shoulder IR: 48  Passive Shoulder ER: 70  Active Shoulder Flexion: 140  Active Shoulder Abduction: 130  Active Shoulder IR: L1  Active Shoulder ER: 70          P.M.H:  Tess is a 42 y.o. female referred to outpatient Physical Therapy with a medical diagnosis of Left RCR. Patient underwent RCR on 7/27/2022. She is here today for her Outpatient Physical Therapy Evaluation in an Abduction Wedge and Sling. She is to be Passive Range of Motion at this time. The therapist initiated a Home Exercise Program of Scap Ret, Pendulums, and Table Slides today. Therapist will progress patient per her MD protocol. Patient will require Physical Therapy intervention to address all deficits and work toward completion of all goals set.        Tess Is progressing well towards her goals.   Pt prognosis is Good.     Pt will continue to benefit from  skilled outpatient physical therapy to address the deficits listed in the problem list box on initial evaluation, provide pt/family education and to maximize pt's level of independence in the home and community environment.     Pt's spiritual, cultural and educational needs considered and pt agreeable to plan of care and goals.     Anticipated barriers to physical therapy: None    Goals :   Short Term Goals : 4 weeks   Independent with Home Exercise Program - Goal Ongoing   Increase Left Shoulder Flexion and Abduction Passive Range of Motion to 120 degrees - Goal Met and Upgraded to Active Range of Motion   Increase Left Shoulder Passive Internal Rotation and External Rotation Range of Motion to 60 degrees  - Goal Partially Met and Upgraded to Active Range of Motion   Increase Left Shoulder Strength to 3/5 - Goal Met   Decrease complaints of Left Shoulder Pain to 4/10 with Activity - Goal Not Met      Long Term Goals : 12 weeks   1.Patient will perform 30 minutes of Activity with use of Left Shoulder with Pain Less than or Equal to 3/10 - Goal Not Met   2. Increase Active Range of Motion to Within Normal Limits - Goal Not Met   3. Increase Left Shoulder and Scapular Strength to 4/5 - Goal Not Met       Plan     Updated Certification Period: 10/6/2022 to 12/2/2022  Recommended Treatment Plan: 2 times per week for 8 weeks: Electrical Stimulation to decrease pain and inflammation as needed, Manual Therapy, Moist Heat/ Ice, Neuromuscular Re-ed, Patient Education, and Therapeutic Exercise      SUJEY LLAMAS, PT, DPT    10/6/2022

## 2022-10-11 ENCOUNTER — CLINICAL SUPPORT (OUTPATIENT)
Dept: REHABILITATION | Facility: HOSPITAL | Age: 42
End: 2022-10-11
Payer: MEDICAID

## 2022-10-11 DIAGNOSIS — M25.412 PAIN AND SWELLING OF LEFT SHOULDER: ICD-10-CM

## 2022-10-11 DIAGNOSIS — R29.898 WEAKNESS OF LEFT ARM: ICD-10-CM

## 2022-10-11 DIAGNOSIS — M25.512 PAIN AND SWELLING OF LEFT SHOULDER: ICD-10-CM

## 2022-10-11 DIAGNOSIS — M25.612 DECREASED RANGE OF MOTION OF LEFT SHOULDER: ICD-10-CM

## 2022-10-11 DIAGNOSIS — M75.122 NONTRAUMATIC COMPLETE TEAR OF LEFT ROTATOR CUFF: Primary | ICD-10-CM

## 2022-10-11 PROCEDURE — 97112 NEUROMUSCULAR REEDUCATION: CPT | Mod: CQ

## 2022-10-11 PROCEDURE — 97140 MANUAL THERAPY 1/> REGIONS: CPT | Mod: CQ

## 2022-10-11 PROCEDURE — 97110 THERAPEUTIC EXERCISES: CPT | Mod: CQ

## 2022-10-11 NOTE — PROGRESS NOTES
"  [facility] Treatment Note     Name: Tess Capone  Clinic Number: [id number]  Visit Date: 10/11/2022  Therapy Diagnosis:            Encounter Diagnoses   Name Primary?    Strain of tendon of left rotator cuff      Complete rotator cuff tear or rupture of left shoulder, not specified as traumatic        Physician: Casi David PA-C  Ortho MD : Girish Norwood     Physician Orders: PT Eval and Treat   Medical Diagnosis from Referral: Left RCR  Evaluation Date: 8/8/2022  Date of Surgery : 7/25/2022  Updated Plan of Care Due : 11/5/2022  Authorization Period Expiration: 8/2/2023  Plan of Care Expiration: 12/2/2022    Visit # / Visits authorized: 17 / Unlimited   PTA Visit #: 1/5    Time In: 10:00 am  Time Out: 10:54 am   Total Billable Time: 54 minutes    Precautions: Standard    Prior Level of Function: Able to perform all ADLs and Activities Independently   Current Level of Function: Unable to use Left Upper Extremity at this time    Subjective     Pt reports:  "I haven't been feeling to good the last few days, sore after last visit but no pain today" "I have been doing my exercises a lot at home"    She was compliant with home exercise program.    Pain: 0/10  Location: left shoulder      Objective     Passive Shoulder Flexion: 150  Passive Shoulder Abduction: 145  Passive Shoulder IR: 48  Passive Shoulder ER: 70  Active Shoulder Flexion: 140  Active Shoulder Abduction: 130  Active Shoulder IR: L1  Active Shoulder ER: 70    Tess received therapeutic exercises to develop strength, endurance and ROM for 30 minutes including:    Shoulder Exercises     UBE  6 Min Fwd/Rev   Pulleys  6 Min Flex/Abd   Left UT stretch  3 x 15 second hold (added due to over activation of UT with flexion)   Corner stretch  3 x 20 second hold    Cane Flexion on Wall   2 x 10    Cane Flexion off Wall   2 x 10    Cybex Rows - Close   2 X 10 3 plates   Cybex Rows - Wide  2 X 10 3 plates   Cybex LATpulldown (new 10/11) 1 " plate 20x    Active Range of Motion : With assistance     Flexion   x 20 yellow TB    Abduction   x 20 yellow TB    External Rotation   2 x 10 red    IR- Wand behind back   2 x 10    Shoulder 4-way  3 x 10 red TB       Table Slides - Flex and Abd    Wall Slides - Flex and abduction together  Finger ladder   20x         Isometrics - all directions                 Serratus Punches   2 x 10  NTV   Supine Cane Flexion   2 x 10 NTV         neuromuscular re-education activities to improve: Coordination, Proprioception, and Posture for 10 minutes. The following activities were included:  Shoulder Ext/Scap Ret with band   2 x 10 with Blue Thera Tubing    Bilateral Horizontal abduction  2 x 10 green handles   Bilateral external rotation  3 x 10 yellow TB     Tess received the following manual therapy techniques: Joint mobilizations, Manual traction and Myofacial release were applied to the: shoulder for 10 minutes, including:     PROM with End Range Stretch  X   Capsular Mobilizations   X   Scapular PNF     Deep Tissue/Myofascial               Patient received the following supervised modalities after being cleared for contradictions: Pre-Mod Electrical Stimulation:  Patient received Pre-Mod Electrical Stimulation for pain control applied to the Left Shoulder. Pt received stimulation at a frequency of  Hz for 0 minutes. Patient tolerated treatment well without any adverse effects.      CP to left shoulder for 0 minutes       Home Exercises Provided and Patient Education Provided     Education provided: AVS    Written Home Exercises Provided: Patient instructed to cont prior HEP.  Exercises were reviewed and Tess was able to demonstrate them prior to the end of the session.  Tess demonstrated good  understanding of the education provided.     See EMR under Patient Instructions for exercises provided prior visit.    Assessment     Added LatPulldown today with increased tolerance to flexion range of motion  overhead. Cues needed to decrease shoulder hike and over activation of UT. Pt did well with this and did not have any increase in p! Post tx today. Educated pt to be diligent with home exercise program. Will progress as tolerated when she returns.       Patient underwent RCR on 7/27/2022. She is now 11 weeks Post Op. She has been in Physical Therapy for 9 weeks and 17 visits. She has done well with the Passive and Active Phases of the protocol and is currently in the resistive phase. She was doing quite well, but then Patient fell on 9/28/2022. She described the fall as slipping out of the shower and landing on her Left shoulder. She denies trying to catch herself with that hand. Therapist assessed her shoulder on 9/29/2022. Supraspinatus appears to be intact just very sore. Therapist has gently added light resistance to her exercises as tolerated. She complains of pain with resisted flexion but has no pain with resisted IR or ER. Therapist is performing Pre-Mod and Ice at end of sessions to decrease pain and inflammation as able. Feel she has flared the supraspinatus tendon up and we will keep a close eye on this. Patient will require continued Physical Therapy intervention to work safely through the protocol.  Will work toward completion of all goals set.   Measurements for 10/6/2022 are as follows :     Passive Shoulder Flexion: 150  Passive Shoulder Abduction: 145  Passive Shoulder IR: 48  Passive Shoulder ER: 70  Active Shoulder Flexion: 140  Active Shoulder Abduction: 130  Active Shoulder IR: L1  Active Shoulder ER: 70        P.M.H:  Tess is a 42 y.o. female referred to outpatient Physical Therapy with a medical diagnosis of Left RCR. Patient underwent RCR on 7/27/2022. She is here today for her Outpatient Physical Therapy Evaluation in an Abduction Wedge and Sling. She is to be Passive Range of Motion at this time. The therapist initiated a Home Exercise Program of Scap Ret, Pendulums, and Table Slides  today. Therapist will progress patient per her MD protocol. Patient will require Physical Therapy intervention to address all deficits and work toward completion of all goals set.        Tess Is progressing well towards her goals.   Pt prognosis is Good.     Pt will continue to benefit from skilled outpatient physical therapy to address the deficits listed in the problem list box on initial evaluation, provide pt/family education and to maximize pt's level of independence in the home and community environment.     Pt's spiritual, cultural and educational needs considered and pt agreeable to plan of care and goals.     Anticipated barriers to physical therapy: None    Goals :   Short Term Goals : 4 weeks   Independent with Home Exercise Program - Goal Ongoing   Increase Left Shoulder Flexion and Abduction Passive Range of Motion to 120 degrees - Goal Met and Upgraded to Active Range of Motion   Increase Left Shoulder Passive Internal Rotation and External Rotation Range of Motion to 60 degrees  - Goal Partially Met and Upgraded to Active Range of Motion   Increase Left Shoulder Strength to 3/5 - Goal Met   Decrease complaints of Left Shoulder Pain to 4/10 with Activity - Goal Not Met      Long Term Goals : 12 weeks   1.Patient will perform 30 minutes of Activity with use of Left Shoulder with Pain Less than or Equal to 3/10 - Goal Not Met   2. Increase Active Range of Motion to Within Normal Limits - Goal Not Met   3. Increase Left Shoulder and Scapular Strength to 4/5 - Goal Not Met       Plan     Updated Certification Period: 10/6/2022 to 12/2/2022  Recommended Treatment Plan: 2 times per week for 8 weeks: Electrical Stimulation to decrease pain and inflammation as needed, Manual Therapy, Moist Heat/ Ice, Neuromuscular Re-ed, Patient Education, and Therapeutic Exercise      Jameson Burns, PTA    10/11/2022

## 2022-10-13 ENCOUNTER — CLINICAL SUPPORT (OUTPATIENT)
Dept: REHABILITATION | Facility: HOSPITAL | Age: 42
End: 2022-10-13
Payer: MEDICAID

## 2022-10-13 DIAGNOSIS — M25.612 DECREASED RANGE OF MOTION OF LEFT SHOULDER: ICD-10-CM

## 2022-10-13 DIAGNOSIS — M25.412 PAIN AND SWELLING OF LEFT SHOULDER: ICD-10-CM

## 2022-10-13 DIAGNOSIS — R29.898 WEAKNESS OF LEFT ARM: ICD-10-CM

## 2022-10-13 DIAGNOSIS — M25.512 PAIN AND SWELLING OF LEFT SHOULDER: ICD-10-CM

## 2022-10-13 DIAGNOSIS — M75.122 NONTRAUMATIC COMPLETE TEAR OF LEFT ROTATOR CUFF: Primary | ICD-10-CM

## 2022-10-13 PROCEDURE — 97112 NEUROMUSCULAR REEDUCATION: CPT | Mod: CQ

## 2022-10-13 PROCEDURE — 97110 THERAPEUTIC EXERCISES: CPT | Mod: CQ

## 2022-10-13 PROCEDURE — 97140 MANUAL THERAPY 1/> REGIONS: CPT | Mod: CQ

## 2022-10-13 NOTE — PROGRESS NOTES
[facility] Treatment Note     Name: Tess Capone  Clinic Number: [id number]  Visit Date: 10/13/2022  Therapy Diagnosis:            Encounter Diagnoses   Name Primary?    Strain of tendon of left rotator cuff      Complete rotator cuff tear or rupture of left shoulder, not specified as traumatic        Physician: Casi David PA-C  Ortho MD : Girish Norwood     Physician Orders: PT Eval and Treat   Medical Diagnosis from Referral: Left RCR  Evaluation Date: 8/8/2022  Date of Surgery : 7/25/2022  Updated Plan of Care Due : 11/5/2022  Authorization Period Expiration: 8/2/2023  Plan of Care Expiration: 12/2/2022    Visit # / Visits authorized: 17 / Unlimited   PTA Visit #: 1/5    Time In: 10:00 am  Time Out: 10:46 am   Total Billable Time: 46 minutes    Precautions: Standard    Prior Level of Function: Able to perform all ADLs and Activities Independently   Current Level of Function: Unable to use Left Upper Extremity at this time    Subjective     Pt reports:  4/10 pain noted today and more soreness reported as well    She was compliant with home exercise program.    Pain: 0/10  Location: left shoulder      Objective     Passive Shoulder Flexion: 150  Passive Shoulder Abduction: 145  Passive Shoulder IR: 48  Passive Shoulder ER: 70  Active Shoulder Flexion: 140  Active Shoulder Abduction: 130  Active Shoulder IR: L1  Active Shoulder ER: 70    Tess received therapeutic exercises to develop strength, endurance and ROM for 15 minutes including:    Shoulder Exercises     UBE  5 Min Fwd/Rev   Pulleys  5 Min Flex/Abd   Left UT stretch  3 x 15 second hold (added due to over activation of UT with flexion)   Corner stretch  3 x 20 second hold    Cane Flexion on Wall   2 x 10    Cane Flexion off Wall   2 x 10    Cybex Rows - Close   2 X 10 4 plates   Cybex Rows - Wide  2 X 10 4 plates   Cybex LATpulldown (new 10/11) 1 plate 20x    Active Range of Motion : With assistance     Flexion   x 15 red TB  NTV    Abduction   x 15 red TB NTV    External Rotation   2 x 10 red    IR- Wand behind back   2 x 10    IR/ER with Band  3 x 10 green tube       Belt IR  4 x 20 seconds                    Eccentric Lowering @ Wall 15x       Serratus Punches   2 x 10  NTV   Supine Cane Flexion   2 x 10 NTV         neuromuscular re-education activities to improve: Coordination, Proprioception, and Posture for 15 minutes. The following activities were included:  Shoulder Ext/Scap Ret with band   3 x 10 with Blue Thera Tubing    Bilateral Horizontal abduction  2 x 10 green handles   Bilateral external rotation  3 x 10 yellow TB     Tess received the following manual therapy techniques: Joint mobilizations, Manual traction and Myofacial release were applied to the: shoulder for 10 minutes, including:     PROM with End Range Stretch  X   Capsular Mobilizations   X   Scapular PNF     Deep Tissue/Myofascial               Patient received the following supervised modalities after being cleared for contradictions: Pre-Mod Electrical Stimulation:  Patient received Pre-Mod Electrical Stimulation for pain control applied to the Left Shoulder. Pt received stimulation at a frequency of  Hz for 0 minutes. Patient tolerated treatment well without any adverse effects.      CP to left shoulder for 0 minutes       Home Exercises Provided and Patient Education Provided     Education provided: AVS    Written Home Exercises Provided: Patient instructed to cont prior HEP.  Exercises were reviewed and Tess was able to demonstrate them prior to the end of the session.  Tess demonstrated good  understanding of the education provided.     See EMR under Patient Instructions for exercises provided prior visit.    Assessment     Pt tolerated all therapeutic exercises and manual ROM today with mild c/o fatigue noted. Pt unable to perform active shoulder flexion with band, so modified to eccentric lowering with cues on shoulder hiking. Educated  pt to keep up with her HEP diligently.      Patient underwent RCR on 7/27/2022. She is now 11 weeks Post Op. She has been in Physical Therapy for 9 weeks and 17 visits. She has done well with the Passive and Active Phases of the protocol and is currently in the resistive phase. She was doing quite well, but then Patient fell on 9/28/2022. She described the fall as slipping out of the shower and landing on her Left shoulder. She denies trying to catch herself with that hand. Therapist assessed her shoulder on 9/29/2022. Supraspinatus appears to be intact just very sore. Therapist has gently added light resistance to her exercises as tolerated. She complains of pain with resisted flexion but has no pain with resisted IR or ER. Therapist is performing Pre-Mod and Ice at end of sessions to decrease pain and inflammation as able. Feel she has flared the supraspinatus tendon up and we will keep a close eye on this. Patient will require continued Physical Therapy intervention to work safely through the protocol.  Will work toward completion of all goals set.   Measurements for 10/6/2022 are as follows :     Passive Shoulder Flexion: 150  Passive Shoulder Abduction: 145  Passive Shoulder IR: 48  Passive Shoulder ER: 70  Active Shoulder Flexion: 140  Active Shoulder Abduction: 130  Active Shoulder IR: L1  Active Shoulder ER: 70        P.M.H:  Tess is a 42 y.o. female referred to outpatient Physical Therapy with a medical diagnosis of Left RCR. Patient underwent RCR on 7/27/2022. She is here today for her Outpatient Physical Therapy Evaluation in an Abduction Wedge and Sling. She is to be Passive Range of Motion at this time. The therapist initiated a Home Exercise Program of Scap Ret, Pendulums, and Table Slides today. Therapist will progress patient per her MD protocol. Patient will require Physical Therapy intervention to address all deficits and work toward completion of all goals set.        Tess Is progressing  well towards her goals.   Pt prognosis is Good.     Pt will continue to benefit from skilled outpatient physical therapy to address the deficits listed in the problem list box on initial evaluation, provide pt/family education and to maximize pt's level of independence in the home and community environment.     Pt's spiritual, cultural and educational needs considered and pt agreeable to plan of care and goals.     Anticipated barriers to physical therapy: None    Goals :   Short Term Goals : 4 weeks   Independent with Home Exercise Program - Goal Ongoing   Increase Left Shoulder Flexion and Abduction Passive Range of Motion to 120 degrees - Goal Met and Upgraded to Active Range of Motion   Increase Left Shoulder Passive Internal Rotation and External Rotation Range of Motion to 60 degrees  - Goal Partially Met and Upgraded to Active Range of Motion   Increase Left Shoulder Strength to 3/5 - Goal Met   Decrease complaints of Left Shoulder Pain to 4/10 with Activity - Goal Not Met      Long Term Goals : 12 weeks   1.Patient will perform 30 minutes of Activity with use of Left Shoulder with Pain Less than or Equal to 3/10 - Goal Not Met   2. Increase Active Range of Motion to Within Normal Limits - Goal Not Met   3. Increase Left Shoulder and Scapular Strength to 4/5 - Goal Not Met       Plan     Updated Certification Period: 10/6/2022 to 12/2/2022  Recommended Treatment Plan: 2 times per week for 8 weeks: Electrical Stimulation to decrease pain and inflammation as needed, Manual Therapy, Moist Heat/ Ice, Neuromuscular Re-ed, Patient Education, and Therapeutic Exercise      Vinod Dawkins, PTA    10/13/2022                                  Positive

## 2022-10-20 ENCOUNTER — CLINICAL SUPPORT (OUTPATIENT)
Dept: REHABILITATION | Facility: HOSPITAL | Age: 42
End: 2022-10-20
Payer: MEDICAID

## 2022-10-20 DIAGNOSIS — R29.898 WEAKNESS OF LEFT ARM: ICD-10-CM

## 2022-10-20 DIAGNOSIS — M25.412 PAIN AND SWELLING OF LEFT SHOULDER: ICD-10-CM

## 2022-10-20 DIAGNOSIS — M25.512 PAIN AND SWELLING OF LEFT SHOULDER: ICD-10-CM

## 2022-10-20 DIAGNOSIS — M25.612 DECREASED RANGE OF MOTION OF LEFT SHOULDER: ICD-10-CM

## 2022-10-20 DIAGNOSIS — M75.122 NONTRAUMATIC COMPLETE TEAR OF LEFT ROTATOR CUFF: Primary | ICD-10-CM

## 2022-10-20 PROCEDURE — 97112 NEUROMUSCULAR REEDUCATION: CPT | Mod: CQ

## 2022-10-20 PROCEDURE — 97110 THERAPEUTIC EXERCISES: CPT | Mod: CQ

## 2022-10-20 NOTE — PROGRESS NOTES
[facility] Treatment Note     Name: Tess Capone  Clinic Number: [id number]  Visit Date: 10/20/2022  Therapy Diagnosis:            Encounter Diagnoses   Name Primary?    Strain of tendon of left rotator cuff      Complete rotator cuff tear or rupture of left shoulder, not specified as traumatic        Physician: Casi David PA-C  Ortho MD : Girish Norwood     Physician Orders: PT Eval and Treat   Medical Diagnosis from Referral: Left RCR  Evaluation Date: 8/8/2022  Date of Surgery : 7/25/2022  Updated Plan of Care Due : 11/5/2022  Authorization Period Expiration: 8/2/2023  Plan of Care Expiration: 12/2/2022    Visit # / Visits authorized: 19 / Unlimited   PTA Visit #: 2/5    Time In: 10:00 am  Time Out: 10:50 am   Total Billable Time: 50 minutes    Precautions: Standard    Prior Level of Function: Able to perform all ADLs and Activities Independently   Current Level of Function: Unable to use Left Upper Extremity at this time    Subjective     Pt reports:  soreness but overall improving. Discouraged her strength is taking a long time to come back    She was compliant with home exercise program.    Pain: 0/10  Location: left shoulder      Objective       Passive Flexion and ER measured 10/20/2022    Passive Shoulder Flexion: 170  Passive Shoulder Abduction: 145  Passive Shoulder IR: 48  Passive Shoulder ER: 90  Active Shoulder Flexion: 140  Active Shoulder Abduction: 130  Active Shoulder IR: L1  Active Shoulder ER: 70    Tess received therapeutic exercises to develop strength, endurance and ROM for 30 minutes including:    Shoulder Exercises     UBE  5 Min Fwd/Rev   Pulleys  5 Min Flex/Abd   Left UT stretch  3 x 15 second hold (added due to over activation of UT with flexion)   Corner stretch  3 x 20 second hold    Cane Flexion on Wall   2 x 10    Cane Flexion off Wall   2 x 10    Cybex Rows - Close   2 X 10 4 plates   Cybex Rows - Wide  2 X 10 4 plates   Cybex LATpulldown (new 10/11) 1  plate 20x    Active Range of Motion : With assistance     Flexion   x 15 red TB NTV    Abduction   x 15 red TB NTV    External Rotation   2 x 10 red    IR- Wand behind back   2 x 10    IR/ER with Band  3 x 10 green tube       Belt IR  4 x 20 seconds                    Eccentric Lowering @ Wall 15x       Serratus Punches   2 x 10  NTV   Supine Cane Flexion   2 x 10 NTV         neuromuscular re-education activities to improve: Coordination, Proprioception, and Posture for 15 minutes. The following activities were included:  Shoulder Ext/Scap Ret with band   3 x 10 with Blue Thera Tubing    Bilateral Horizontal abduction  2 x 10 green handles   Bilateral external rotation  3 x 10 yellow TB     Tess received the following manual therapy techniques: Joint mobilizations, Manual traction and Myofacial release were applied to the: shoulder for 0 minutes, including:     PROM with End Range Stretch  X   Capsular Mobilizations   X   Scapular PNF     Deep Tissue/Myofascial               Patient received the following supervised modalities after being cleared for contradictions: Pre-Mod Electrical Stimulation:  Patient received Pre-Mod Electrical Stimulation for pain control applied to the Left Shoulder. Pt received stimulation at a frequency of  Hz for 0 minutes. Patient tolerated treatment well without any adverse effects.      CP to left shoulder for 0 minutes       Home Exercises Provided and Patient Education Provided     Education provided: AVS    Written Home Exercises Provided: Patient instructed to cont prior HEP.  Exercises were reviewed and Tess was able to demonstrate them prior to the end of the session.  Tess demonstrated good  understanding of the education provided.     See EMR under Patient Instructions for exercises provided prior visit.    Assessment     Pt tolerated all therapeutic exercises today with fatigue noted. Passive flexion is 170 and ER is 90 which is a big improvement. Pt still  has difficulty actively flexing her shoulder past 70 degrees so worked on more eccentrics and pt shows good control with her shoulder. Educated pt to keep up with her HEP diligently.       Patient underwent RCR on 7/27/2022. She is now 11 weeks Post Op. She has been in Physical Therapy for 9 weeks and 17 visits. She has done well with the Passive and Active Phases of the protocol and is currently in the resistive phase. She was doing quite well, but then Patient fell on 9/28/2022. She described the fall as slipping out of the shower and landing on her Left shoulder. She denies trying to catch herself with that hand. Therapist assessed her shoulder on 9/29/2022. Supraspinatus appears to be intact just very sore. Therapist has gently added light resistance to her exercises as tolerated. She complains of pain with resisted flexion but has no pain with resisted IR or ER. Therapist is performing Pre-Mod and Ice at end of sessions to decrease pain and inflammation as able. Feel she has flared the supraspinatus tendon up and we will keep a close eye on this. Patient will require continued Physical Therapy intervention to work safely through the protocol.  Will work toward completion of all goals set.   Measurements for 10/6/2022 are as follows :     Passive Shoulder Flexion: 150  Passive Shoulder Abduction: 145  Passive Shoulder IR: 48  Passive Shoulder ER: 70  Active Shoulder Flexion: 140  Active Shoulder Abduction: 130  Active Shoulder IR: L1  Active Shoulder ER: 70        P.M.H:  Tess is a 42 y.o. female referred to outpatient Physical Therapy with a medical diagnosis of Left RCR. Patient underwent RCR on 7/27/2022. She is here today for her Outpatient Physical Therapy Evaluation in an Abduction Wedge and Sling. She is to be Passive Range of Motion at this time. The therapist initiated a Home Exercise Program of Scap Ret, Pendulums, and Table Slides today. Therapist will progress patient per her MD protocol. Patient  will require Physical Therapy intervention to address all deficits and work toward completion of all goals set.        Tess Is progressing well towards her goals.   Pt prognosis is Good.     Pt will continue to benefit from skilled outpatient physical therapy to address the deficits listed in the problem list box on initial evaluation, provide pt/family education and to maximize pt's level of independence in the home and community environment.     Pt's spiritual, cultural and educational needs considered and pt agreeable to plan of care and goals.     Anticipated barriers to physical therapy: None    Goals :   Short Term Goals : 4 weeks   Independent with Home Exercise Program - Goal Ongoing   Increase Left Shoulder Flexion and Abduction Passive Range of Motion to 120 degrees - Goal Met and Upgraded to Active Range of Motion   Increase Left Shoulder Passive Internal Rotation and External Rotation Range of Motion to 60 degrees  - Goal Partially Met and Upgraded to Active Range of Motion   Increase Left Shoulder Strength to 3/5 - Goal Met   Decrease complaints of Left Shoulder Pain to 4/10 with Activity - Goal Not Met      Long Term Goals : 12 weeks   1.Patient will perform 30 minutes of Activity with use of Left Shoulder with Pain Less than or Equal to 3/10 - Goal Not Met   2. Increase Active Range of Motion to Within Normal Limits - Goal Not Met   3. Increase Left Shoulder and Scapular Strength to 4/5 - Goal Not Met       Plan     Updated Certification Period: 10/6/2022 to 12/2/2022  Recommended Treatment Plan: 2 times per week for 8 weeks: Electrical Stimulation to decrease pain and inflammation as needed, Manual Therapy, Moist Heat/ Ice, Neuromuscular Re-ed, Patient Education, and Therapeutic Exercise      Vinod Dawkins, PTA    10/20/2022

## 2022-10-25 ENCOUNTER — CLINICAL SUPPORT (OUTPATIENT)
Dept: REHABILITATION | Facility: HOSPITAL | Age: 42
End: 2022-10-25
Payer: MEDICAID

## 2022-10-25 DIAGNOSIS — M25.612 DECREASED RANGE OF MOTION OF LEFT SHOULDER: ICD-10-CM

## 2022-10-25 DIAGNOSIS — M25.412 PAIN AND SWELLING OF LEFT SHOULDER: ICD-10-CM

## 2022-10-25 DIAGNOSIS — M25.512 PAIN AND SWELLING OF LEFT SHOULDER: ICD-10-CM

## 2022-10-25 DIAGNOSIS — R29.898 WEAKNESS OF LEFT ARM: ICD-10-CM

## 2022-10-25 DIAGNOSIS — M75.122 NONTRAUMATIC COMPLETE TEAR OF LEFT ROTATOR CUFF: Primary | ICD-10-CM

## 2022-10-25 PROCEDURE — 97112 NEUROMUSCULAR REEDUCATION: CPT

## 2022-10-25 PROCEDURE — 97110 THERAPEUTIC EXERCISES: CPT

## 2022-10-25 PROCEDURE — 97140 MANUAL THERAPY 1/> REGIONS: CPT

## 2022-10-25 NOTE — PROGRESS NOTES
[facility] Treatment Note     Name: Tess Capone  Clinic Number: [id number]  Visit Date: 10/25/2022  Therapy Diagnosis:            Encounter Diagnoses   Name Primary?    Strain of tendon of left rotator cuff      Complete rotator cuff tear or rupture of left shoulder, not specified as traumatic        Physician: Casi David PA-C  Ortho MD : Girish Norwood     Physician Orders: PT Eval and Treat   Medical Diagnosis from Referral: Left RCR  Evaluation Date: 8/8/2022  Date of Surgery : 7/25/2022  Updated Plan of Care Due : 11/5/2022  Authorization Period Expiration: 8/2/2023  Plan of Care Expiration: 12/2/2022    Visit # / Visits authorized: 20 / Unlimited   PTA Visit #: 2/5    Time In: 10:07 am  Time Out: 11:10  am   Total Billable Time: 58 minutes    Precautions: Standard    Prior Level of Function: Able to perform all ADLs and Activities Independently   Current Level of Function: Unable to use Left Upper Extremity at this time    Subjective     Pt reports:  still having soreness from fall few weeks ago    She was compliant with home exercise program.    Pain: 6/10  Location: left shoulder      Objective       Passive Flexion and ER measured 10/25/2022    Passive Shoulder Flexion: 170  Passive Shoulder Abduction: 160  Passive Shoulder IR: 60  Passive Shoulder ER: 90  Active Shoulder Flexion: 140  Active Shoulder Abduction: 130  Active Shoulder IR: L1  Active Shoulder ER: 70    Tess received therapeutic exercises to develop strength, endurance and ROM for 35 minutes including:    Shoulder Exercises     UBE  5 Min Fwd/Rev   Pulleys  5 Min Flex/Abd   Left UT stretch  3 x 15 second hold (added due to over activation of UT with flexion)   Corner stretch  3 x 20 second hold    Cane Flexion on Wall   2 x 10    Cane Flexion off Wall   2 x 10    Cybex Rows - Close   2 X 10 4 plates   Cybex Rows - Wide  2 X 10 4 plates   Cybex LATpulldown   3 x 10 2 plates   Active Range of Motion : With  "assistance     Flexion   3 x 10 green TB    Abduction   x 15 red TB NTV    External Rotation   3 x 10 green TB    IR- Wand behind back   2 x 10    IR/ER with Band  3 x 10 green TB       Belt IR  4 x 20 seconds   Cybex shoulder press  3 x 10 2 plates               Eccentric Lowering @ Wall 15x with 6" ball       Serratus Punches   2 x 10  NTV   Supine Cane Flexion   2 x 10 NTV         neuromuscular re-education activities to improve: Coordination, Proprioception, and Posture for 15 minutes. The following activities were included:  Shoulder Ext/Scap Ret with band   3 x 10 with Blue Thera Tubing    Bilateral Horizontal abduction  3 x 10 green    Bilateral external rotation  3 x 10 green TB     Tess received the following manual therapy techniques: Joint mobilizations, Manual traction and Myofacial release were applied to the: shoulder for 8 minutes, including:     PROM with End Range Stretch  X   Capsular Mobilizations   X   Scapular PNF     Deep Tissue/Myofascial               Patient received the following supervised modalities after being cleared for contradictions: Pre-Mod Electrical Stimulation:  Patient received Pre-Mod Electrical Stimulation for pain control applied to the Left Shoulder. Pt received stimulation at a frequency of  Hz for 0 minutes. Patient tolerated treatment well without any adverse effects.      CP to left shoulder for 0 minutes       Home Exercises Provided and Patient Education Provided     Education provided: AVS    Written Home Exercises Provided: Patient instructed to cont prior HEP.  Exercises were reviewed and Tess was able to demonstrate them prior to the end of the session.  Tess demonstrated good  understanding of the education provided.     See EMR under Patient Instructions for exercises provided prior visit.    Assessment     Added Cybex shoulder press and increased weight on lat pulldowns today. Patient exhibits improved ACTIVE RANGE OF MOTION with decreased " upper trap activation. Will continue to aggressively advance strengthening.      P.M.H:  Tess is a 42 y.o. female referred to outpatient Physical Therapy with a medical diagnosis of Left RCR. Patient underwent RCR on 7/27/2022. She is here today for her Outpatient Physical Therapy Evaluation in an Abduction Wedge and Sling. She is to be Passive Range of Motion at this time. The therapist initiated a Home Exercise Program of Scap Ret, Pendulums, and Table Slides today. Therapist will progress patient per her MD protocol. Patient will require Physical Therapy intervention to address all deficits and work toward completion of all goals set.        Tess Is progressing well towards her goals.   Pt prognosis is Good.     Pt will continue to benefit from skilled outpatient physical therapy to address the deficits listed in the problem list box on initial evaluation, provide pt/family education and to maximize pt's level of independence in the home and community environment.     Pt's spiritual, cultural and educational needs considered and pt agreeable to plan of care and goals.     Anticipated barriers to physical therapy: None    Goals :   Short Term Goals : 4 weeks   Independent with Home Exercise Program - Goal Ongoing   Increase Left Shoulder Flexion and Abduction Passive Range of Motion to 120 degrees - Goal Met and Upgraded to Active Range of Motion   Increase Left Shoulder Passive Internal Rotation and External Rotation Range of Motion to 60 degrees  - Goal Partially Met and Upgraded to Active Range of Motion   Increase Left Shoulder Strength to 3/5 - Goal Met   Decrease complaints of Left Shoulder Pain to 4/10 with Activity - Goal Not Met      Long Term Goals : 12 weeks   1.Patient will perform 30 minutes of Activity with use of Left Shoulder with Pain Less than or Equal to 3/10 - Goal Not Met   2. Increase Active Range of Motion to Within Normal Limits - Goal Not Met   3. Increase Left Shoulder and  Scapular Strength to 4/5 - Goal Not Met       Plan     Updated Certification Period: 10/6/2022 to 12/2/2022  Recommended Treatment Plan: 2 times per week for 8 weeks: Electrical Stimulation to decrease pain and inflammation as needed, Manual Therapy, Moist Heat/ Ice, Neuromuscular Re-ed, Patient Education, and Therapeutic Exercise      RANDI LLAMAS, PT, MLT    10/25/2022

## 2022-10-27 ENCOUNTER — CLINICAL SUPPORT (OUTPATIENT)
Dept: REHABILITATION | Facility: HOSPITAL | Age: 42
End: 2022-10-27
Payer: MEDICAID

## 2022-10-27 DIAGNOSIS — M25.412 PAIN AND SWELLING OF LEFT SHOULDER: ICD-10-CM

## 2022-10-27 DIAGNOSIS — R29.898 WEAKNESS OF LEFT ARM: ICD-10-CM

## 2022-10-27 DIAGNOSIS — M25.512 PAIN AND SWELLING OF LEFT SHOULDER: ICD-10-CM

## 2022-10-27 DIAGNOSIS — M25.612 DECREASED RANGE OF MOTION OF LEFT SHOULDER: ICD-10-CM

## 2022-10-27 DIAGNOSIS — M75.122 NONTRAUMATIC COMPLETE TEAR OF LEFT ROTATOR CUFF: Primary | ICD-10-CM

## 2022-10-27 PROCEDURE — 97140 MANUAL THERAPY 1/> REGIONS: CPT | Mod: CQ

## 2022-10-27 PROCEDURE — 97112 NEUROMUSCULAR REEDUCATION: CPT | Mod: CQ

## 2022-10-27 PROCEDURE — 97014 ELECTRIC STIMULATION THERAPY: CPT | Mod: CQ

## 2022-10-27 PROCEDURE — 97110 THERAPEUTIC EXERCISES: CPT | Mod: CQ

## 2022-10-27 NOTE — PROGRESS NOTES
[facility] Treatment Note     Name: Tess Capone  Clinic Number: [id number]  Visit Date: 10/27/2022  Therapy Diagnosis:            Encounter Diagnoses   Name Primary?    Strain of tendon of left rotator cuff      Complete rotator cuff tear or rupture of left shoulder, not specified as traumatic        Physician: Casi David PA-C  Ortho MD : Girish Norwood     Physician Orders: PT Eval and Treat   Medical Diagnosis from Referral: Left RCR  Evaluation Date: 8/8/2022  Date of Surgery : 7/25/2022  Updated Plan of Care Due : 11/5/2022  Authorization Period Expiration: 8/2/2023  Plan of Care Expiration: 12/2/2022    Visit # / Visits authorized: 21 / Unlimited   PTA Visit #: 1/5    Time In: 09:55 am  Time Out: 10:58 am   Total Billable Time: 63 minutes    Precautions: Standard    Prior Level of Function: Able to perform all ADLs and Activities Independently   Current Level of Function: resistance above 90 is painful    Subjective     Pt reports:  still having soreness and tenderness in the front and top part with popping present. She saw MD Monday and reports she has MRI on December 5th. She feels like the popping has gotten worse.     She was compliant with home exercise program.    Pain: 6/10  Location: left shoulder      Objective     Passive Flexion and ER measured 10/25/2022    Passive Shoulder Flexion: 170  Passive Shoulder Abduction: 160  Passive Shoulder IR: 60  Passive Shoulder ER: 90  Active Shoulder Flexion: 140  Active Shoulder Abduction: 130  Active Shoulder IR: L1  Active Shoulder ER: 70    Tess received therapeutic exercises to develop strength, endurance and ROM for 30 minutes including:      Shoulder Exercises   UBE  5 Min Fwd/Rev   Pulleys  5 Min Flex/Abd   Left UT stretch  3 x 15 second hold (added due to over activation of UT with flexion)   Corner stretch  3 x 20 second hold    Cane Flexion on Wall   2 x 10    Cane Flexion off Wall   2 x 10    Cane flexion with ER  20x   "  Cybex Rows - Close   3 X 10 4 plates   Cybex Rows - Wide  3 X 10 4 plates   Cybex LATpulldown   3 x 10 2 plates NTV   Active Range of Motion : With assistance     Flexion   3 x 10 red TB (limited to 90 degrees)    Abduction   x 15 red TB (limited to 90 degrees)    External Rotation   3 x 10 green TB    IR- Wand behind back   2 x 10    IR/ER with Band  3 x 10 green TB       Belt IR  4 x 20 seconds   Cybex shoulder press  3 x 10 2 plates NTV               Eccentric Lowering @ Wall 15x with 6" ball       Serratus Punches   2 x 10  NTV   Supine Cane Flexion   2 x 10 NTV         neuromuscular re-education activities to improve: Coordination, Proprioception, and Posture for 13 minutes. The following activities were included:  Shoulder Ext/Scap Ret with band   3 x 10 with Blue Thera Tubing    Bilateral Horizontal abduction  3 x 10 green    Bilateral external rotation  3 x 10 green TB     Tess received the following manual therapy techniques: Joint mobilizations, Manual traction and Myofacial release were applied to the: shoulder for 10 minutes, including:     PROM with End Range Stretch  X   Capsular Mobilizations   X   Scapular PNF     Deep Tissue/Myofascial               Patient received the following supervised modalities after being cleared for contradictions: Pre-Mod Electrical Stimulation:  Patient received Pre-Mod Electrical Stimulation for pain control applied to the Left Shoulder. Pt received stimulation at a frequency of  Hz for 10 minutes. Patient tolerated treatment well without any adverse effects.      HP to left shoulder for 10 minutes in conjunction with ESTIM      Home Exercises Provided and Patient Education Provided     Education provided: AVS    Written Home Exercises Provided: Patient instructed to cont prior HEP.  Exercises were reviewed and Tess was able to demonstrate them prior to the end of the session.  Tess demonstrated good  understanding of the education provided.     See " "EMR under Patient Instructions for exercises provided prior visit.    Assessment     Pt arrived to therapy today with c/o's p! With raising arm into flexion above 90 degrees and "popping" occurring more frequently with resistance of forward flexion. Pt unable to tolerate resistance past 90 degrees today or green tband as progressed last tx. Used red band for forward flexion and kept range of motion limited within p! Free range.  Ended tx today with PREMOD and MHP to left shoulder due to increased p! Complaints. Will continue to advance strengthening as tolerated within p! Free range when pt returns.       P.M.H:  Tess is a 42 y.o. female referred to outpatient Physical Therapy with a medical diagnosis of Left RCR. Patient underwent RCR on 7/27/2022. She is here today for her Outpatient Physical Therapy Evaluation in an Abduction Wedge and Sling. She is to be Passive Range of Motion at this time. The therapist initiated a Home Exercise Program of Scap Ret, Pendulums, and Table Slides today. Therapist will progress patient per her MD protocol. Patient will require Physical Therapy intervention to address all deficits and work toward completion of all goals set.        Tess Is progressing well towards her goals.   Pt prognosis is Good.     Pt will continue to benefit from skilled outpatient physical therapy to address the deficits listed in the problem list box on initial evaluation, provide pt/family education and to maximize pt's level of independence in the home and community environment.     Pt's spiritual, cultural and educational needs considered and pt agreeable to plan of care and goals.     Anticipated barriers to physical therapy: None    Goals :   Short Term Goals : 4 weeks   Independent with Home Exercise Program - Goal Ongoing   Increase Left Shoulder Flexion and Abduction Passive Range of Motion to 120 degrees - Goal Met and Upgraded to Active Range of Motion   Increase Left Shoulder Passive " Internal Rotation and External Rotation Range of Motion to 60 degrees  - Goal Partially Met and Upgraded to Active Range of Motion   Increase Left Shoulder Strength to 3/5 - Goal Met   Decrease complaints of Left Shoulder Pain to 4/10 with Activity - Goal Not Met      Long Term Goals : 12 weeks   1.Patient will perform 30 minutes of Activity with use of Left Shoulder with Pain Less than or Equal to 3/10 - Goal Not Met   2. Increase Active Range of Motion to Within Normal Limits - Goal Not Met   3. Increase Left Shoulder and Scapular Strength to 4/5 - Goal Not Met       Plan     Updated Certification Period: 10/6/2022 to 12/2/2022  Recommended Treatment Plan: 2 times per week for 8 weeks: Electrical Stimulation to decrease pain and inflammation as needed, Manual Therapy, Moist Heat/ Ice, Neuromuscular Re-ed, Patient Education, and Therapeutic Exercise      Jameson Burns, PTA    10/27/2022

## 2022-11-01 ENCOUNTER — CLINICAL SUPPORT (OUTPATIENT)
Dept: REHABILITATION | Facility: HOSPITAL | Age: 42
End: 2022-11-01
Payer: MEDICAID

## 2022-11-01 DIAGNOSIS — M75.122 NONTRAUMATIC COMPLETE TEAR OF LEFT ROTATOR CUFF: Primary | ICD-10-CM

## 2022-11-01 DIAGNOSIS — M25.512 PAIN AND SWELLING OF LEFT SHOULDER: ICD-10-CM

## 2022-11-01 DIAGNOSIS — R29.898 WEAKNESS OF LEFT ARM: ICD-10-CM

## 2022-11-01 DIAGNOSIS — M25.612 DECREASED RANGE OF MOTION OF LEFT SHOULDER: ICD-10-CM

## 2022-11-01 DIAGNOSIS — M25.412 PAIN AND SWELLING OF LEFT SHOULDER: ICD-10-CM

## 2022-11-01 PROCEDURE — 97014 ELECTRIC STIMULATION THERAPY: CPT

## 2022-11-01 PROCEDURE — 97110 THERAPEUTIC EXERCISES: CPT

## 2022-11-01 PROCEDURE — 97112 NEUROMUSCULAR REEDUCATION: CPT

## 2022-11-01 NOTE — PROGRESS NOTES
[facility] Treatment Note     Name: Tess Capone  Clinic Number: [id number]  Visit Date: 11/1/2022  Therapy Diagnosis:            Encounter Diagnoses   Name Primary?    Strain of tendon of left rotator cuff      Complete rotator cuff tear or rupture of left shoulder, not specified as traumatic        Physician: Casi David PA-C  Ortho MD : Girish Norwood     Physician Orders: PT Eval and Treat   Medical Diagnosis from Referral: Left RCR  Evaluation Date: 8/8/2022  Date of Surgery : 7/25/2022  Updated Plan of Care Due : 11/5/2022  Authorization Period Expiration: 8/2/2023  Plan of Care Expiration: 12/2/2022    Visit # / Visits authorized: 22 / Unlimited   PTA Visit #: 1/5    Time In: 10:00 am  Time Out: 11:00 am   Total Billable Time: 60 minutes    Precautions: Standard    Prior Level of Function: Able to perform all ADLs and Activities Independently   Current Level of Function: resistance above 90 is painful    Subjective     Pt reports:  still having soreness and tenderness in the front and top part with popping present. She saw MD Monday and reports she has MRI on December 5th. She feels like the popping has gotten worse.     She was compliant with home exercise program.    Pain: 6/10  Location: left shoulder      Objective     Passive Flexion and ER measured 10/25/2022    Passive Shoulder Flexion: 170  Passive Shoulder Abduction: 160  Passive Shoulder IR: 60  Passive Shoulder ER: 90  Active Shoulder Flexion: 140  Active Shoulder Abduction: 130  Active Shoulder IR: L1  Active Shoulder ER: 70    Tess received therapeutic exercises to develop strength, endurance and ROM for 35 minutes including:      Shoulder Exercises   UBE  5 Min Fwd/Rev   Pulleys  5 Min Flex/Abd   Left UT stretch  3 x 15 second hold (added due to over activation of UT with flexion)   Corner stretch  3 x 20 second hold    Cane Flexion on Wall   2 x 10    Cane Flexion off Wall   2 x 10    Cane flexion with ER  20x   "  Cybex Rows - Close   3 X 10 4 plates   Cybex Rows - Wide  3 X 10 4 plates   Cybex LATpulldown   3 x 10 2 plates NTV   Active Range of Motion : With assistance     Flexion   3 x 10 red TB (limited to 90 degrees)    Abduction   x 15 red TB (limited to 90 degrees)    External Rotation   3 x 10 green TB    IR- Wand behind back   2 x 10    IR/ER with Band  3 x 10 green TB       Belt IR  4 x 20 seconds   Cybex shoulder press  3 x 10 2 plates NTV               Eccentric Lowering @ Wall 15x with 6" ball       Serratus Punches   2 x 10  NTV   Supine Cane Flexion   2 x 10 NTV         neuromuscular re-education activities to improve: Coordination, Proprioception, and Posture for 10 minutes. The following activities were included:  Shoulder Ext/Scap Ret with band   3 x 10 with Blue Thera Tubing    Bilateral Horizontal abduction  3 x 10 green    Bilateral external rotation  3 x 10 green TB     Tess received the following manual therapy techniques: Joint mobilizations, Manual traction and Myofacial release were applied to the: shoulder for 0 minutes, including:     PROM with End Range Stretch  X   Capsular Mobilizations   X   Scapular PNF     Deep Tissue/Myofascial               Patient received the following supervised modalities after being cleared for contradictions: Pre-Mod Electrical Stimulation:  Patient received Pre-Mod Electrical Stimulation for pain control applied to the Left Shoulder. Pt received stimulation at a frequency of  Hz for 15 minutes. Patient tolerated treatment well without any adverse effects.      HP to left shoulder for 15 minutes in conjunction with ESTIM      Home Exercises Provided and Patient Education Provided     Education provided: AVS    Written Home Exercises Provided: Patient instructed to cont prior HEP.  Exercises were reviewed and Tess was able to demonstrate them prior to the end of the session.  Tess demonstrated good  understanding of the education provided.     See " EMR under Patient Instructions for exercises provided prior visit.    Assessment     Patient continues to have pain and popping in the Left shoulder around the supraspinatus insertion. Therapist is concerned that she may have damaged the supraspinatus near/at the anchor when she fell in the shower several weeks ago. Therapist is limiting her resistance with Flexion and Abduction to approx 45 degrees to limit the popping and pain in that shoulder. Her Passive Range of Motion remains Within Normal Limits. Therapist ended session with Pre-Mod and Moist Heat to help decrease pain and inflammation as able. She is scheduled for an MRI 12/5 to try to visualize the dysfunction. Therapist will continue to work with patient conservatively to try to maintain Passive Range of Motion and increase Active Range of Motion and strength as able. Sup Visit performed today with SAMIRA Melchor and SAMIRA Gonzalez.  All goals and treatment plan reviewed. Will work toward completion of all goals set.           P.M.H:  Tess is a 42 y.o. female referred to outpatient Physical Therapy with a medical diagnosis of Left RCR. Patient underwent RCR on 7/27/2022. She is here today for her Outpatient Physical Therapy Evaluation in an Abduction Wedge and Sling. She is to be Passive Range of Motion at this time. The therapist initiated a Home Exercise Program of Scap Ret, Pendulums, and Table Slides today. Therapist will progress patient per her MD protocol. Patient will require Physical Therapy intervention to address all deficits and work toward completion of all goals set.        Tess Is progressing well towards her goals.   Pt prognosis is Good.     Pt will continue to benefit from skilled outpatient physical therapy to address the deficits listed in the problem list box on initial evaluation, provide pt/family education and to maximize pt's level of independence in the home and community environment.     Pt's spiritual, cultural  and educational needs considered and pt agreeable to plan of care and goals.     Anticipated barriers to physical therapy: None    Goals :   Short Term Goals : 4 weeks   Independent with Home Exercise Program - Goal Ongoing   Increase Left Shoulder Flexion and Abduction Passive Range of Motion to 120 degrees - Goal Met and Upgraded to Active Range of Motion   Increase Left Shoulder Passive Internal Rotation and External Rotation Range of Motion to 60 degrees  - Goal Partially Met and Upgraded to Active Range of Motion   Increase Left Shoulder Strength to 3/5 - Goal Met   Decrease complaints of Left Shoulder Pain to 4/10 with Activity - Goal Not Met      Long Term Goals : 12 weeks   1.Patient will perform 30 minutes of Activity with use of Left Shoulder with Pain Less than or Equal to 3/10 - Goal Not Met   2. Increase Active Range of Motion to Within Normal Limits - Goal Not Met   3. Increase Left Shoulder and Scapular Strength to 4/5 - Goal Not Met       Plan     Updated Certification Period: 10/6/2022 to 12/2/2022  Recommended Treatment Plan: 2 times per week for 8 weeks: Electrical Stimulation to decrease pain and inflammation as needed, Manual Therapy, Moist Heat/ Ice, Neuromuscular Re-ed, Patient Education, and Therapeutic Exercise      SUJEY LLAMAS, PT, DPT     11/1/2022

## 2022-11-08 ENCOUNTER — CLINICAL SUPPORT (OUTPATIENT)
Dept: REHABILITATION | Facility: HOSPITAL | Age: 42
End: 2022-11-08
Payer: MEDICAID

## 2022-11-08 DIAGNOSIS — M25.412 PAIN AND SWELLING OF LEFT SHOULDER: ICD-10-CM

## 2022-11-08 DIAGNOSIS — R29.898 WEAKNESS OF LEFT ARM: ICD-10-CM

## 2022-11-08 DIAGNOSIS — M25.512 PAIN AND SWELLING OF LEFT SHOULDER: ICD-10-CM

## 2022-11-08 DIAGNOSIS — M25.612 DECREASED RANGE OF MOTION OF LEFT SHOULDER: ICD-10-CM

## 2022-11-08 DIAGNOSIS — M75.122 NONTRAUMATIC COMPLETE TEAR OF LEFT ROTATOR CUFF: Primary | ICD-10-CM

## 2022-11-08 PROCEDURE — 97140 MANUAL THERAPY 1/> REGIONS: CPT | Mod: CQ

## 2022-11-08 PROCEDURE — 97110 THERAPEUTIC EXERCISES: CPT | Mod: CQ

## 2022-11-08 PROCEDURE — 97010 HOT OR COLD PACKS THERAPY: CPT | Mod: CQ

## 2022-11-08 PROCEDURE — 97032 APPL MODALITY 1+ESTIM EA 15: CPT | Mod: CQ

## 2022-11-08 NOTE — PROGRESS NOTES
[facility] Treatment Note     Name: Tess Capone  Clinic Number: [id number]  Visit Date: 11/8/2022  Therapy Diagnosis:            Encounter Diagnoses   Name Primary?    Strain of tendon of left rotator cuff      Complete rotator cuff tear or rupture of left shoulder, not specified as traumatic        Physician: Casi David PA-C  Ortho MD : Girish Norwood     Physician Orders: PT Eval and Treat   Medical Diagnosis from Referral: Left RCR  Evaluation Date: 8/8/2022  Date of Surgery : 7/25/2022  Updated Plan of Care Due : 11/5/2022  Authorization Period Expiration: 8/2/2023  Plan of Care Expiration: 12/2/2022    Visit # / Visits authorized: 22 / Unlimited   PTA Visit #: 1/5    Time In: 10:03 am  Time Out: 10:51 am   Total Billable Time: 48 minutes    Precautions: Standard    Prior Level of Function: Able to perform all ADLs and Activities Independently   Current Level of Function: resistance above 90 is painful    Subjective     Pt reports:  7/10 pain noted today. No change in pain in a few weeks    She was compliant with home exercise program.    Pain: 6/10  Location: left shoulder      Objective     Passive Flexion and ER measured 10/25/2022    Passive Shoulder Flexion: 170  Passive Shoulder Abduction: 160  Passive Shoulder IR: 60  Passive Shoulder ER: 90  Active Shoulder Flexion: 140  Active Shoulder Abduction: 130  Active Shoulder IR: L1  Active Shoulder ER: 70    Tess received therapeutic exercises to develop strength, endurance and ROM for 15 minutes including:      Shoulder Exercises   UBE  5 Min Fwd/Rev   Pulleys  5 Min Flex/Abd   Left UT stretch  3 x 15 second hold (added due to over activation of UT with flexion)   Corner stretch  3 x 20 second hold    Cane Flexion on Wall   2 x 10    Cane Flexion off Wall   2 x 10    Cane flexion with ER  20x    Cybex Rows - Close   3 X 10 4 plates   Cybex Rows - Wide  3 X 10 4 plates   Cybex LATpulldown   3 x 10 2 plates NTV   Active Range of  "Motion : With assistance     Flexion   3 x 10 red TB (limited to 90 degrees)    Abduction   x 15 red TB (limited to 90 degrees)    External Rotation   3 x 10 green TB    IR- Wand behind back   2 x 10    IR/ER with Band  3 x 10 green TB       Belt IR  4 x 20 seconds   Cybex shoulder press  3 x 10 2 plates NTV               Eccentric Lowering @ Wall 15x with 6" ball       Serratus Punches   2 x 10  NTV   Supine Cane Flexion   2 x 10 NTV         neuromuscular re-education activities to improve: Coordination, Proprioception, and Posture for 0 minutes. The following activities were included:  Shoulder Ext/Scap Ret with band   3 x 10 with Blue Thera Tubing    Bilateral Horizontal abduction  3 x 10 green    Bilateral external rotation  3 x 10 green TB     Tess received the following manual therapy techniques: Joint mobilizations, Manual traction and Myofacial release were applied to the: shoulder for 15 minutes, including:     PROM with End Range Stretch  X   Capsular Mobilizations   X   Scapular PNF     Deep Tissue/Myofascial               Patient received the following supervised modalities after being cleared for contradictions: Pre-Mod Electrical Stimulation:  Patient received Pre-Mod Electrical Stimulation for pain control applied to the Left Shoulder. Pt received stimulation at a frequency of  Hz for 15 minutes. Patient tolerated treatment well without any adverse effects.      CP to left shoulder for 15 minutes in conjunction with ESTIM      Home Exercises Provided and Patient Education Provided     Education provided: AVS    Written Home Exercises Provided: Patient instructed to cont prior HEP.  Exercises were reviewed and Tess was able to demonstrate them prior to the end of the session.  Patriciarojelio demonstrated good  understanding of the education provided.     See EMR under Patient Instructions for exercises provided prior visit.    Assessment     Pt continues to have pain during most exercises. " Deferred all strengthening exercises today to focus on ROM and pain control. Pt has good passive ROM with firm end feels. Ended session with CP/Pre Mod to help with pain/swelling.          P.M.H:  Tess is a 42 y.o. female referred to outpatient Physical Therapy with a medical diagnosis of Left RCR. Patient underwent RCR on 7/27/2022. She is here today for her Outpatient Physical Therapy Evaluation in an Abduction Wedge and Sling. She is to be Passive Range of Motion at this time. The therapist initiated a Home Exercise Program of Scap Ret, Pendulums, and Table Slides today. Therapist will progress patient per her MD protocol. Patient will require Physical Therapy intervention to address all deficits and work toward completion of all goals set.        Tess Is progressing well towards her goals.   Pt prognosis is Good.     Pt will continue to benefit from skilled outpatient physical therapy to address the deficits listed in the problem list box on initial evaluation, provide pt/family education and to maximize pt's level of independence in the home and community environment.     Pt's spiritual, cultural and educational needs considered and pt agreeable to plan of care and goals.     Anticipated barriers to physical therapy: None    Goals :   Short Term Goals : 4 weeks   Independent with Home Exercise Program - Goal Ongoing   Increase Left Shoulder Flexion and Abduction Passive Range of Motion to 120 degrees - Goal Met and Upgraded to Active Range of Motion   Increase Left Shoulder Passive Internal Rotation and External Rotation Range of Motion to 60 degrees  - Goal Partially Met and Upgraded to Active Range of Motion   Increase Left Shoulder Strength to 3/5 - Goal Met   Decrease complaints of Left Shoulder Pain to 4/10 with Activity - Goal Not Met      Long Term Goals : 12 weeks   1.Patient will perform 30 minutes of Activity with use of Left Shoulder with Pain Less than or Equal to 3/10 - Goal Not Met   2.  Increase Active Range of Motion to Within Normal Limits - Goal Not Met   3. Increase Left Shoulder and Scapular Strength to 4/5 - Goal Not Met       Plan     Updated Certification Period: 10/6/2022 to 12/2/2022  Recommended Treatment Plan: 2 times per week for 8 weeks: Electrical Stimulation to decrease pain and inflammation as needed, Manual Therapy, Moist Heat/ Ice, Neuromuscular Re-ed, Patient Education, and Therapeutic Exercise      Vinod Dawkins PTA,     11/8/2022

## 2022-11-10 ENCOUNTER — CLINICAL SUPPORT (OUTPATIENT)
Dept: REHABILITATION | Facility: HOSPITAL | Age: 42
End: 2022-11-10
Payer: MEDICAID

## 2022-11-10 DIAGNOSIS — M75.122 NONTRAUMATIC COMPLETE TEAR OF LEFT ROTATOR CUFF: Primary | ICD-10-CM

## 2022-11-10 DIAGNOSIS — R29.898 WEAKNESS OF LEFT ARM: ICD-10-CM

## 2022-11-10 DIAGNOSIS — M25.512 PAIN AND SWELLING OF LEFT SHOULDER: ICD-10-CM

## 2022-11-10 DIAGNOSIS — M25.412 PAIN AND SWELLING OF LEFT SHOULDER: ICD-10-CM

## 2022-11-10 DIAGNOSIS — M25.612 DECREASED RANGE OF MOTION OF LEFT SHOULDER: ICD-10-CM

## 2022-11-10 PROCEDURE — 97014 ELECTRIC STIMULATION THERAPY: CPT

## 2022-11-10 PROCEDURE — 97110 THERAPEUTIC EXERCISES: CPT

## 2022-11-10 PROCEDURE — 97112 NEUROMUSCULAR REEDUCATION: CPT

## 2022-11-10 NOTE — PROGRESS NOTES
[facility] Treatment Note     Name: Tess Capone  Clinic Number: [id number]  Visit Date: 11/10/2022  Therapy Diagnosis:            Encounter Diagnoses   Name Primary?    Strain of tendon of left rotator cuff      Complete rotator cuff tear or rupture of left shoulder, not specified as traumatic        Physician: Casi David PA-C  Ortho MD : Girish Norwood     Physician Orders: PT Eval and Treat   Medical Diagnosis from Referral: Left RCR  Evaluation Date: 8/8/2022  Date of Surgery : 7/25/2022  Updated Plan of Care Due : 12/9/2022  Authorization Period Expiration: 8/2/2023  Plan of Care Expiration: 12/9/2022    Visit # / Visits authorized: 24 / Unlimited   PTA Visit #: 1/5    Time In: 9:50 am  Time Out: 10:48 am   Total Billable Time: 55 minutes    Precautions: Standard    Prior Level of Function: Able to perform all ADLs and Activities Independently   Current Level of Function: resistance above 90 is painful    Subjective     Pt reports:  5/10 pain noted today. She still reports popping in the Left shoulder.    She was compliant with home exercise program.    Pain: 5/10  Location: left shoulder      Objective     Passive Flexion and ER measured 11/10/2022    Passive Shoulder Flexion: 170  Passive Shoulder Abduction: 160  Passive Shoulder IR: 60  Passive Shoulder ER: 90  Active Shoulder Flexion: 150  Active Shoulder Abduction: 140  Active Shoulder IR: T10  Active Shoulder ER: 75    Tess received therapeutic exercises to develop strength, endurance and ROM for 25 minutes including:      Shoulder Exercises   UBE  5 Min Fwd/Rev   Pulleys  5 Min Flex/Abd   Left UT stretch  3 x 15 second hold (added due to over activation of UT with flexion)   Corner stretch  3 x 20 second hold    Cane Flexion on Wall   2 x 10    Cane Flexion off Wall   2 x 10    Cane flexion with ER  20x    Cybex Rows - Close   3 X 10 4 plates   Cybex Rows - Wide  3 X 10 4 plates   Cybex LATpulldown   3 x 10 2 plates NTV  "  Active Range of Motion : With assistance     Flexion   3 x 10 red TB (limited to 90 degrees)    Abduction   x 15 red TB (limited to 90 degrees)    External Rotation   3 x 10 green TB    IR- Wand behind back   2 x 10    IR/ER with Band  3 x 10 green TB       Belt IR  4 x 20 seconds   Cybex shoulder press  3 x 10 2 plates NT               Eccentric Lowering @ Wall 15x with 6" ball       Serratus Punches   2 x 10  NTV   Supine Cane Flexion   2 x 10 NTV         neuromuscular re-education activities to improve: Coordination, Proprioception, and Posture for 0 minutes. The following activities were included:  Shoulder Ext/Scap Ret with band   3 x 10 with Blue Thera Tubing    Bilateral Horizontal abduction  3 x 10 green    Bilateral external rotation  3 x 10 green TB     Tess received the following manual therapy techniques: Joint mobilizations, Manual traction and Myofacial release were applied to the: shoulder for 15 minutes, including:     PROM with End Range Stretch  X   Capsular Mobilizations   X   Scapular PNF     Deep Tissue/Myofascial               Patient received the following supervised modalities after being cleared for contradictions: Pre-Mod Electrical Stimulation:  Patient received Pre-Mod Electrical Stimulation for pain control applied to the Left Shoulder. Pt received stimulation at a frequency of  Hz for 15 minutes. Patient tolerated treatment well without any adverse effects.      CP to left shoulder for 15 minutes in conjunction with ESTIM      Home Exercises Provided and Patient Education Provided     Education provided: AVS    Written Home Exercises Provided: Patient instructed to cont prior HEP.  Exercises were reviewed and Tess was able to demonstrate them prior to the end of the session.  Patriciarojelio demonstrated good  understanding of the education provided.     See EMR under Patient Instructions for exercises provided prior visit.    Assessment     Patient underwent RCR on 7/27/2022. " She was doing very well with Therapy, but then Patient fell on 9/28/2022. She described the fall as slipping out of the shower and landing on her Left shoulder. She denies trying to catch herself with that hand. Her shoulder has progressively gotten more painful since that time. Patient continues to have pain and popping in the Left shoulder around the supraspinatus insertion. Therapist is concerned that she may have damaged the supraspinatus near/at the anchor when she fell in the shower. Therapist has had to limit rehab accordingly depending upon patient's tolerance from session to session. Her Passive Range of Motion remains Within Normal Limits. Therapist ended session with Pre-Mod and Moist Heat to help decrease pain and inflammation as able. She is scheduled for an MRI 12/5 to try to visualize the dysfunction. Therapist will continue to work with patient conservatively to try to maintain Passive Range of Motion and increase Active Range of Motion and strength as able. Sup Visit performed today with SAMIRA Melchor and SAMIRA Gonzalez.  All goals and treatment plan reviewed. Will work toward completion of all goals set.     Measured on 11/10/2022 :     Passive Shoulder Flexion: 170  Passive Shoulder Abduction: 160  Passive Shoulder IR: 60  Passive Shoulder ER: 90  Active Shoulder Flexion: 150  Active Shoulder Abduction: 140  Active Shoulder IR: T10  Active Shoulder ER: 75          P.M.H:  Tess is a 42 y.o. female referred to outpatient Physical Therapy with a medical diagnosis of Left RCR. Patient underwent RCR on 7/27/2022. She is here today for her Outpatient Physical Therapy Evaluation in an Abduction Wedge and Sling. She is to be Passive Range of Motion at this time. The therapist initiated a Home Exercise Program of Scap Ret, Pendulums, and Table Slides today. Therapist will progress patient per her MD protocol. Patient will require Physical Therapy intervention to address all deficits and work  toward completion of all goals set.        Tess Is progressing well towards her goals.   Pt prognosis is Good.     Pt will continue to benefit from skilled outpatient physical therapy to address the deficits listed in the problem list box on initial evaluation, provide pt/family education and to maximize pt's level of independence in the home and community environment.     Pt's spiritual, cultural and educational needs considered and pt agreeable to plan of care and goals.     Anticipated barriers to physical therapy: None    Goals :   Short Term Goals : 4 weeks   Independent with Home Exercise Program - Goal Ongoing   Increase Left Shoulder Flexion and Abduction Active Range of Motion to 120 degrees - Goal Met but this is painful  Increase Left Shoulder Active Internal Rotation and External Rotation Range of Motion to 60 degrees  - Goal Met  Increase Left Shoulder Strength to 3/5 - Goal Met   Decrease complaints of Left Shoulder Pain to 4/10 with Activity - Goal Not Met      Long Term Goals : 12 weeks   1.Patient will perform 30 minutes of Activity with use of Left Shoulder with Pain Less than or Equal to 3/10 - Goal Not Met   2. Increase Active Range of Motion to Within Normal Limits - Goal Not Met   3. Increase Left Shoulder and Scapular Strength to 4/5 - Goal Not Met       Plan     Updated Certification Period: 11/10/2022 to 12/9/2022  Recommended Treatment Plan: 2 times per week for 4 weeks: Electrical Stimulation to decrease pain and inflammation as needed, Manual Therapy, Moist Heat/ Ice, Neuromuscular Re-ed, Patient Education, Therapeutic Activities, and Therapeutic Exercise      SUJEY LLAMAS, PT, DPT     11/10/2022

## 2022-11-10 NOTE — PLAN OF CARE
[facility] Updated Plan of Care      Name: Tess Capone  Clinic Number: [id number]  Visit Date: 11/10/2022  Therapy Diagnosis:            Encounter Diagnoses   Name Primary?    Strain of tendon of left rotator cuff      Complete rotator cuff tear or rupture of left shoulder, not specified as traumatic        Physician: Casi David PA-C  Ortho MD : iGrish Norwood     Physician Orders: PT Eval and Treat   Medical Diagnosis from Referral: Left RCR  Evaluation Date: 8/8/2022  Date of Surgery : 7/25/2022  Updated Plan of Care Due : 12/9/2022  Authorization Period Expiration: 8/2/2023  Plan of Care Expiration: 12/9/2022    Visit # / Visits authorized: 24 / Unlimited   PTA Visit #: 1/5    Time In: 9:50 am  Time Out: 10:48 am   Total Billable Time: 55 minutes    Precautions: Standard    Prior Level of Function: Able to perform all ADLs and Activities Independently   Current Level of Function: resistance above 90 is painful    Subjective     Pt reports:  5/10 pain noted today. She still reports popping in the Left shoulder.    She was compliant with home exercise program.    Pain: 5/10  Location: left shoulder      Objective     Passive Flexion and ER measured 11/10/2022    Passive Shoulder Flexion: 170  Passive Shoulder Abduction: 160  Passive Shoulder IR: 60  Passive Shoulder ER: 90  Active Shoulder Flexion: 150  Active Shoulder Abduction: 140  Active Shoulder IR: T10  Active Shoulder ER: 75    Tess received therapeutic exercises to develop strength, endurance and ROM for 25 minutes including:      Shoulder Exercises   UBE  5 Min Fwd/Rev   Pulleys  5 Min Flex/Abd   Left UT stretch  3 x 15 second hold (added due to over activation of UT with flexion)   Corner stretch  3 x 20 second hold    Cane Flexion on Wall   2 x 10    Cane Flexion off Wall   2 x 10    Cane flexion with ER  20x    Cybex Rows - Close   3 X 10 4 plates   Cybex Rows - Wide  3 X 10 4 plates   Cybex LATpulldown   3 x 10 2 plates  "NTV   Active Range of Motion : With assistance     Flexion   3 x 10 red TB (limited to 90 degrees)    Abduction   x 15 red TB (limited to 90 degrees)    External Rotation   3 x 10 green TB    IR- Wand behind back   2 x 10    IR/ER with Band  3 x 10 green TB       Belt IR  4 x 20 seconds   Cybex shoulder press  3 x 10 2 plates NT               Eccentric Lowering @ Wall 15x with 6" ball       Serratus Punches   2 x 10  NTV   Supine Cane Flexion   2 x 10 NTV         neuromuscular re-education activities to improve: Coordination, Proprioception, and Posture for 15 minutes. The following activities were included:  Shoulder Ext/Scap Ret with band   3 x 10 with Blue Thera Tubing    Bilateral Horizontal abduction  3 x 10 green    Bilateral external rotation  3 x 10 green TB     Tess received the following manual therapy techniques: Joint mobilizations, Manual traction and Myofacial release were applied to the: shoulder for 0 minutes, including:     PROM with End Range Stretch  X   Capsular Mobilizations   X   Scapular PNF     Deep Tissue/Myofascial               Patient received the following supervised modalities after being cleared for contradictions: Pre-Mod Electrical Stimulation:  Patient received Pre-Mod Electrical Stimulation for pain control applied to the Left Shoulder. Pt received stimulation at a frequency of  Hz for 15 minutes. Patient tolerated treatment well without any adverse effects.      CP to left shoulder for 15 minutes in conjunction with ESTIM      Home Exercises Provided and Patient Education Provided     Education provided: AVS    Written Home Exercises Provided: Patient instructed to cont prior HEP.  Exercises were reviewed and Tess was able to demonstrate them prior to the end of the session.  Tess demonstrated good  understanding of the education provided.     See EMR under Patient Instructions for exercises provided prior visit.    Assessment     Patient underwent RCR on " 7/27/2022. She was doing very well with Therapy, but then Patient fell on 9/28/2022. She described the fall as slipping out of the shower and landing on her Left shoulder. She denies trying to catch herself with that hand. Her shoulder has progressively gotten more painful since that time. Patient continues to have pain and popping in the Left shoulder around the supraspinatus insertion. Therapist is concerned that she may have damaged the supraspinatus near/at the anchor when she fell in the shower. Therapist has had to limit rehab accordingly depending upon patient's tolerance from session to session. Her Passive Range of Motion remains Within Normal Limits. Therapist ended session with Pre-Mod and Moist Heat to help decrease pain and inflammation as able. She is scheduled for an MRI 12/5 to try to visualize the dysfunction. Therapist will continue to work with patient conservatively to try to maintain Passive Range of Motion and increase Active Range of Motion and strength as able. Sup Visit performed today with SAMIRA Melchor and SAMIRA Gonzalez.  All goals and treatment plan reviewed. Will work toward completion of all goals set.     Measured on 11/10/2022 :     Passive Shoulder Flexion: 170  Passive Shoulder Abduction: 160  Passive Shoulder IR: 60  Passive Shoulder ER: 90  Active Shoulder Flexion: 150  Active Shoulder Abduction: 140  Active Shoulder IR: T10  Active Shoulder ER: 75          P.M.H:  Tess is a 42 y.o. female referred to outpatient Physical Therapy with a medical diagnosis of Left RCR. Patient underwent RCR on 7/27/2022. She is here today for her Outpatient Physical Therapy Evaluation in an Abduction Wedge and Sling. She is to be Passive Range of Motion at this time. The therapist initiated a Home Exercise Program of Scap Ret, Pendulums, and Table Slides today. Therapist will progress patient per her MD protocol. Patient will require Physical Therapy intervention to address all  deficits and work toward completion of all goals set.        Tess Is progressing well towards her goals.   Pt prognosis is Good.     Pt's spiritual, cultural and educational needs considered and pt agreeable to plan of care and goals.     Anticipated barriers to physical therapy: None    Goals :   Short Term Goals : 4 weeks   Independent with Home Exercise Program - Goal Ongoing   Increase Left Shoulder Flexion and Abduction Active Range of Motion to 120 degrees - Goal Met but this is painful  Increase Left Shoulder Active Internal Rotation and External Rotation Range of Motion to 60 degrees  - Goal Met  Increase Left Shoulder Strength to 3/5 - Goal Met   Decrease complaints of Left Shoulder Pain to 4/10 with Activity - Goal Not Met      Long Term Goals : 12 weeks   1.Patient will perform 30 minutes of Activity with use of Left Shoulder with Pain Less than or Equal to 3/10 - Goal Not Met   2. Increase Active Range of Motion to Within Normal Limits - Goal Not Met   3. Increase Left Shoulder and Scapular Strength to 4/5 - Goal Not Met     Reasons for Recertification of Therapy: Pt will continue to benefit from skilled outpatient physical therapy to address the deficits listed in the problem list box on initial evaluation, provide pt/family education and to maximize pt's level of independence in the home and community environment.     Plan     Updated Certification Period: 11/10/2022 to 12/9/2022  Recommended Treatment Plan: 2 times per week for 4 weeks: Electrical Stimulation to decrease pain and inflammation as needed, Manual Therapy, Moist Heat/ Ice, Neuromuscular Re-ed, Patient Education, Therapeutic Activities, and Therapeutic Exercise      SUJEY LLAMAS, PT, DPT   11/10/2022      I CERTIFY THE NEED FOR THESE SERVICES FURNISHED UNDER THIS PLAN OF TREATMENT AND WHILE UNDER MY CARE.    Physician's comments:      Physician's Signature: ___________________________________________________

## 2022-11-17 ENCOUNTER — CLINICAL SUPPORT (OUTPATIENT)
Dept: REHABILITATION | Facility: HOSPITAL | Age: 42
End: 2022-11-17
Payer: MEDICAID

## 2022-11-17 DIAGNOSIS — M25.512 PAIN AND SWELLING OF LEFT SHOULDER: ICD-10-CM

## 2022-11-17 DIAGNOSIS — M25.412 PAIN AND SWELLING OF LEFT SHOULDER: ICD-10-CM

## 2022-11-17 DIAGNOSIS — R29.898 WEAKNESS OF LEFT ARM: ICD-10-CM

## 2022-11-17 DIAGNOSIS — M25.612 DECREASED RANGE OF MOTION OF LEFT SHOULDER: ICD-10-CM

## 2022-11-17 DIAGNOSIS — M75.122 NONTRAUMATIC COMPLETE TEAR OF LEFT ROTATOR CUFF: Primary | ICD-10-CM

## 2022-11-17 PROCEDURE — 97110 THERAPEUTIC EXERCISES: CPT

## 2022-11-17 PROCEDURE — 97112 NEUROMUSCULAR REEDUCATION: CPT

## 2022-11-17 PROCEDURE — 97014 ELECTRIC STIMULATION THERAPY: CPT

## 2022-11-17 PROCEDURE — 97140 MANUAL THERAPY 1/> REGIONS: CPT

## 2022-11-17 NOTE — PROGRESS NOTES
[facility] Treatment Note     Name: Tess Capone  Clinic Number: [id number]  Visit Date: 11/17/2022  Therapy Diagnosis:            Encounter Diagnoses   Name Primary?    Strain of tendon of left rotator cuff      Complete rotator cuff tear or rupture of left shoulder, not specified as traumatic        Physician: Casi David PA-C  Ortho MD : Girish Norwood     Physician Orders: PT Eval and Treat   Medical Diagnosis from Referral: Left RCR  Evaluation Date: 8/8/2022  Date of Surgery : 7/25/2022  Updated Plan of Care Due : 12/9/2022  Authorization Period Expiration: 8/2/2023  Plan of Care Expiration: 12/9/2022    Visit # / Visits authorized: 25 / Unlimited   PTA Visit #: 1/5    Time In: 10:00 am  Time Out: 11:00 am   Total Billable Time: 60 minutes    Precautions: Standard    Prior Level of Function: Able to perform all ADLs and Activities Independently   Current Level of Function: resistance above 90 is painful    Subjective     Pt reports:  She has been doing her exercises at home.     She was compliant with home exercise program.    Pain: 4/10  Location: left shoulder      Objective     Passive Flexion and ER measured 11/10/2022    Passive Shoulder Flexion: 170  Passive Shoulder Abduction: 160  Passive Shoulder IR: 60  Passive Shoulder ER: 90  Active Shoulder Flexion: 150  Active Shoulder Abduction: 140  Active Shoulder IR: T10  Active Shoulder ER: 75    Tess received therapeutic exercises to develop strength, endurance and ROM for 20 minutes including:      Shoulder Exercises   UBE  5 Min Fwd/Rev   Pulleys  5 Min Flex/Abd   Left UT stretch  3 x 15 second hold (added due to over activation of UT with flexion)   Corner stretch  3 x 20 second hold    Cane Flexion on Wall   2 x 10    Cane Flexion off Wall   2 x 10    Cane flexion with ER  20x    Cybex Rows - Close   3 X 10 4 plates   Cybex Rows - Wide  3 X 10 4 plates   Cybex LATpulldown   3 x 10 2 plates NTV   Active Range of Motion :  "With assistance     Flexion   3 x 10 red TB (limited to 90 degrees)    Abduction   x 15 red TB (limited to 90 degrees)    External Rotation   3 x 10 green TB    IR- Wand behind back   2 x 10    IR/ER with Band  3 x 10 green TB       Belt IR  4 x 20 seconds   Cybex shoulder press  3 x 10 2 plates NT               Eccentric Lowering @ Wall 15x with 6" ball       Serratus Punches   2 x 10  NTV   Supine Cane Flexion   2 x 10 NTV         neuromuscular re-education activities to improve: Coordination, Proprioception, and Posture for 10 minutes. The following activities were included:  Shoulder Ext/Scap Ret with band   3 x 10 with Blue Thera Tubing    Bilateral Horizontal abduction  3 x 10 green    Bilateral external rotation  3 x 10 green TB     Tess received the following manual therapy techniques: Joint mobilizations, Manual traction and Myofacial release were applied to the: shoulder for 15 minutes, including:     PROM with End Range Stretch  X   Capsular Mobilizations   X   Scapular PNF     Deep Tissue/Myofascial               Patient received the following supervised modalities after being cleared for contradictions: Pre-Mod Electrical Stimulation:  Patient received Pre-Mod Electrical Stimulation for pain control applied to the Left Shoulder. Pt received stimulation at a frequency of  Hz for 15 minutes. Patient tolerated treatment well without any adverse effects.      HP to left shoulder for 15 minutes in conjunction with ESTIM      Home Exercises Provided and Patient Education Provided     Education provided: AVS    Written Home Exercises Provided: Patient instructed to cont prior HEP.  Exercises were reviewed and Tess was able to demonstrate them prior to the end of the session.  Tess demonstrated good  understanding of the education provided.     See EMR under Patient Instructions for exercises provided prior visit.    Assessment     Patient is continuing to have pain and popping in the Left " shoulder. Therapist is working gently with patient to increase Range of Motion and Strength in the Left shoulder. Ended session today with Pre-Mod and Moist Heat to Left Shoulder. Will continue to work with patient as tolerated.         Measured on 11/10/2022 :     Passive Shoulder Flexion: 170  Passive Shoulder Abduction: 160  Passive Shoulder IR: 60  Passive Shoulder ER: 90  Active Shoulder Flexion: 150  Active Shoulder Abduction: 140  Active Shoulder IR: T10  Active Shoulder ER: 75          P.M.H:  Tess is a 42 y.o. female referred to outpatient Physical Therapy with a medical diagnosis of Left RCR. Patient underwent RCR on 7/27/2022. She is here today for her Outpatient Physical Therapy Evaluation in an Abduction Wedge and Sling. She is to be Passive Range of Motion at this time. The therapist initiated a Home Exercise Program of Scap Ret, Pendulums, and Table Slides today. Therapist will progress patient per her MD protocol. Patient will require Physical Therapy intervention to address all deficits and work toward completion of all goals set.        Tess Is progressing well towards her goals.   Pt prognosis is Good.     Pt will continue to benefit from skilled outpatient physical therapy to address the deficits listed in the problem list box on initial evaluation, provide pt/family education and to maximize pt's level of independence in the home and community environment.     Pt's spiritual, cultural and educational needs considered and pt agreeable to plan of care and goals.     Anticipated barriers to physical therapy: None    Goals :   Short Term Goals : 4 weeks   Independent with Home Exercise Program - Goal Ongoing   Increase Left Shoulder Flexion and Abduction Active Range of Motion to 120 degrees - Goal Met but this is painful  Increase Left Shoulder Active Internal Rotation and External Rotation Range of Motion to 60 degrees  - Goal Met  Increase Left Shoulder Strength to 3/5 - Goal Met    Decrease complaints of Left Shoulder Pain to 4/10 with Activity - Goal Not Met      Long Term Goals : 12 weeks   1.Patient will perform 30 minutes of Activity with use of Left Shoulder with Pain Less than or Equal to 3/10 - Goal Not Met   2. Increase Active Range of Motion to Within Normal Limits - Goal Not Met   3. Increase Left Shoulder and Scapular Strength to 4/5 - Goal Not Met       Plan     Updated Certification Period: 11/10/2022 to 12/9/2022  Recommended Treatment Plan: 2 times per week for 4 weeks: Electrical Stimulation to decrease pain and inflammation as needed, Manual Therapy, Moist Heat/ Ice, Neuromuscular Re-ed, Patient Education, Therapeutic Activities, and Therapeutic Exercise      SUJEY LLAMAS, PT, DPT     11/17/2022

## 2022-11-29 ENCOUNTER — CLINICAL SUPPORT (OUTPATIENT)
Dept: REHABILITATION | Facility: HOSPITAL | Age: 42
End: 2022-11-29
Payer: MEDICAID

## 2022-11-29 DIAGNOSIS — R29.898 WEAKNESS OF LEFT ARM: ICD-10-CM

## 2022-11-29 DIAGNOSIS — M25.512 PAIN AND SWELLING OF LEFT SHOULDER: ICD-10-CM

## 2022-11-29 DIAGNOSIS — M75.122 NONTRAUMATIC COMPLETE TEAR OF LEFT ROTATOR CUFF: Primary | ICD-10-CM

## 2022-11-29 DIAGNOSIS — M25.612 DECREASED RANGE OF MOTION OF LEFT SHOULDER: ICD-10-CM

## 2022-11-29 DIAGNOSIS — M25.412 PAIN AND SWELLING OF LEFT SHOULDER: ICD-10-CM

## 2022-11-29 PROCEDURE — 97112 NEUROMUSCULAR REEDUCATION: CPT

## 2022-11-29 PROCEDURE — 97110 THERAPEUTIC EXERCISES: CPT

## 2022-11-29 PROCEDURE — 97140 MANUAL THERAPY 1/> REGIONS: CPT

## 2022-11-29 PROCEDURE — 97014 ELECTRIC STIMULATION THERAPY: CPT

## 2022-11-29 NOTE — PROGRESS NOTES
[facility] Treatment Note     Name: Tess Capone  Clinic Number: [id number]  Visit Date: 11/29/2022  Therapy Diagnosis:            Encounter Diagnoses   Name Primary?    Strain of tendon of left rotator cuff      Complete rotator cuff tear or rupture of left shoulder, not specified as traumatic        Physician: Casi David PA-C  Ortho MD : Girish Norwood     Physician Orders: PT Eval and Treat   Medical Diagnosis from Referral: Left RCR  Evaluation Date: 8/8/2022  Date of Surgery : 7/25/2022  Updated Plan of Care Due : 12/9/2022  Authorization Period Expiration: 8/2/2023  Plan of Care Expiration: 12/9/2022    Visit # / Visits authorized: 26 / Unlimited   PTA Visit #: 1/5    Time In: 9:50 am  Time Out: 10:52 am   Total Billable Time: 60 minutes    Precautions: Standard    Prior Level of Function: Able to perform all ADLs and Activities Independently   Current Level of Function: resistance above 90 is painful    Subjective     Pt reports:  She is still having pain in the Left Shoulder. She is scheduled for an MRI next week on 12/5/2022.     She was compliant with home exercise program.    Pain: 4/10  Location: left shoulder      Objective     Passive Flexion and ER measured 11/10/2022    Passive Shoulder Flexion: 170  Passive Shoulder Abduction: 160  Passive Shoulder IR: 60  Passive Shoulder ER: 90  Active Shoulder Flexion: 150  Active Shoulder Abduction: 140  Active Shoulder IR: T10  Active Shoulder ER: 75    Tess received therapeutic exercises to develop strength, endurance and ROM for 20 minutes including:      Shoulder Exercises   UBE  5 Min Fwd/Rev   Pulleys  5 Min Flex/Abd   Left UT stretch  3 x 15 second hold (added due to over activation of UT with flexion)   Corner stretch  3 x 20 second hold    Cane Flexion on Wall   2 x 10    Cane Flexion off Wall   2 x 10    Cane flexion with ER  20x    Cybex Rows - Close   3 X 10 4 plates   Cybex Rows - Wide  3 X 10 4 plates   Cybex  "LATpulldown   3 x 10 2 plates NTV   Active Range of Motion : With assistance     Flexion   3 x 10 red TB (limited to 90 degrees)    Abduction   x 15 red TB (limited to 90 degrees)    External Rotation   3 x 10 green TB    IR- Wand behind back   2 x 10    IR/ER with Band  3 x 10 green TB       Belt IR  4 x 20 seconds   Cybex shoulder press  3 x 10 2 plates NT               Eccentric Lowering @ Wall 15x with 6" ball       Serratus Punches   2 x 10  NTV   Supine Cane Flexion   2 x 10 NTV         neuromuscular re-education activities to improve: Coordination, Proprioception, and Posture for 10 minutes. The following activities were included:  Shoulder Ext/Scap Ret with band   3 x 10 with Blue Thera Tubing    Bilateral Horizontal abduction  3 x 10 green    Bilateral external rotation  3 x 10 green TB     Tess received the following manual therapy techniques: Joint mobilizations, Manual traction and Myofacial release were applied to the: shoulder for 15 minutes, including:     PROM with End Range Stretch  X   Capsular Mobilizations   X   Scapular PNF     Deep Tissue/Myofascial               Patient received the following supervised modalities after being cleared for contradictions: Pre-Mod Electrical Stimulation:  Patient received Pre-Mod Electrical Stimulation for pain control applied to the Left Shoulder. Pt received stimulation at a frequency of  Hz for 15 minutes. Patient tolerated treatment well without any adverse effects.      HP to left shoulder for 15 minutes in conjunction with ESTIM      Home Exercises Provided and Patient Education Provided     Education provided: AVS    Written Home Exercises Provided: Patient instructed to cont prior HEP.  Exercises were reviewed and Tess was able to demonstrate them prior to the end of the session.  Tess demonstrated good  understanding of the education provided.     See EMR under Patient Instructions for exercises provided prior visit.    Assessment "     She is still having pain, popping, and dysfunction in the Left Shoulder. She is scheduled for an MRI next week on 12/5/2022. Instructing patient on shoulder and scapular strengthening. Therapist is limiting the resisted exercises at this time while we await MRI. Ended session today with Pre-Mod and Moist Heat to Left Shoulder. Will continue to work with patient as tolerated.         Measured on 11/10/2022 :     Passive Shoulder Flexion: 170  Passive Shoulder Abduction: 160  Passive Shoulder IR: 60  Passive Shoulder ER: 90  Active Shoulder Flexion: 150  Active Shoulder Abduction: 140  Active Shoulder IR: T10  Active Shoulder ER: 75          P.M.H:  Tess is a 42 y.o. female referred to outpatient Physical Therapy with a medical diagnosis of Left RCR. Patient underwent RCR on 7/27/2022. She is here today for her Outpatient Physical Therapy Evaluation in an Abduction Wedge and Sling. She is to be Passive Range of Motion at this time. The therapist initiated a Home Exercise Program of Scap Ret, Pendulums, and Table Slides today. Therapist will progress patient per her MD protocol. Patient will require Physical Therapy intervention to address all deficits and work toward completion of all goals set.        Tess Is progressing well towards her goals.   Pt prognosis is Good.     Pt will continue to benefit from skilled outpatient physical therapy to address the deficits listed in the problem list box on initial evaluation, provide pt/family education and to maximize pt's level of independence in the home and community environment.     Pt's spiritual, cultural and educational needs considered and pt agreeable to plan of care and goals.     Anticipated barriers to physical therapy: None    Goals :   Short Term Goals : 4 weeks   Independent with Home Exercise Program - Goal Ongoing   Increase Left Shoulder Flexion and Abduction Active Range of Motion to 120 degrees - Goal Met but this is painful  Increase Left  Shoulder Active Internal Rotation and External Rotation Range of Motion to 60 degrees  - Goal Met  Increase Left Shoulder Strength to 3/5 - Goal Met   Decrease complaints of Left Shoulder Pain to 4/10 with Activity - Goal Not Met      Long Term Goals : 12 weeks   1.Patient will perform 30 minutes of Activity with use of Left Shoulder with Pain Less than or Equal to 3/10 - Goal Not Met   2. Increase Active Range of Motion to Within Normal Limits - Goal Not Met   3. Increase Left Shoulder and Scapular Strength to 4/5 - Goal Not Met       Plan     Updated Certification Period: 11/10/2022 to 12/9/2022  Recommended Treatment Plan: 2 times per week for 4 weeks: Electrical Stimulation to decrease pain and inflammation as needed, Manual Therapy, Moist Heat/ Ice, Neuromuscular Re-ed, Patient Education, Therapeutic Activities, and Therapeutic Exercise      SUJEY LLAMAS, PT, DPT     11/29/2022

## 2022-12-23 ENCOUNTER — HOSPITAL ENCOUNTER (OUTPATIENT)
Dept: RADIOLOGY | Facility: HOSPITAL | Age: 42
Discharge: HOME | End: 2022-12-23
Attending: ORTHOPAEDIC SURGERY
Payer: MEDICAID

## 2022-12-23 DIAGNOSIS — R52 PAIN: ICD-10-CM

## 2022-12-23 PROCEDURE — 73221 MRI SHOULDER WITHOUT CONTRAST LEFT: ICD-10-PCS | Mod: 26,LT,, | Performed by: RADIOLOGY

## 2022-12-23 PROCEDURE — 73221 MRI JOINT UPR EXTREM W/O DYE: CPT | Mod: 26,LT,, | Performed by: RADIOLOGY

## 2022-12-23 PROCEDURE — 73221 MRI JOINT UPR EXTREM W/O DYE: CPT | Mod: TC,LT

## 2023-05-03 PROCEDURE — 88142 CYTOPATH C/V THIN LAYER: CPT | Mod: TC,GCY | Performed by: NURSE PRACTITIONER

## 2023-05-03 PROCEDURE — 87624 HPV HI-RISK TYP POOLED RSLT: CPT | Performed by: NURSE PRACTITIONER

## 2023-05-04 ENCOUNTER — LAB REQUISITION (OUTPATIENT)
Dept: LAB | Facility: HOSPITAL | Age: 43
End: 2023-05-04
Attending: NURSE PRACTITIONER
Payer: MEDICAID

## 2023-05-04 DIAGNOSIS — Z12.4 ENCOUNTER FOR SCREENING FOR MALIGNANT NEOPLASM OF CERVIX: ICD-10-CM

## 2023-05-04 DIAGNOSIS — Z01.419 ENCOUNTER FOR GYNECOLOGICAL EXAMINATION (GENERAL) (ROUTINE) WITHOUT ABNORMAL FINDINGS: ICD-10-CM

## 2023-05-04 LAB
GH SERPL-MCNC: NORMAL NG/ML
INSULIN SERPL-ACNC: NORMAL U[IU]/ML
LAB AP GYN INTERPRETATION: NEGATIVE
LAB AP PAP DISCLAIMER COMMENTS: NORMAL
RENIN PLAS-CCNC: NORMAL NG/ML/H

## 2023-05-05 LAB
HPV 16: NEGATIVE
HPV 18: NEGATIVE
HPV OTHER: NEGATIVE

## 2023-06-24 ENCOUNTER — HOSPITAL ENCOUNTER (EMERGENCY)
Facility: HOSPITAL | Age: 43
Discharge: HOME | End: 2023-06-24
Attending: EMERGENCY MEDICINE
Payer: MEDICAID

## 2023-06-24 VITALS
HEART RATE: 75 BPM | OXYGEN SATURATION: 100 % | WEIGHT: 206 LBS | DIASTOLIC BLOOD PRESSURE: 108 MMHG | SYSTOLIC BLOOD PRESSURE: 190 MMHG | BODY MASS INDEX: 33.11 KG/M2 | TEMPERATURE: 98 F | HEIGHT: 66 IN | RESPIRATION RATE: 16 BRPM

## 2023-06-24 DIAGNOSIS — M54.50 ACUTE BILATERAL LOW BACK PAIN WITHOUT SCIATICA: Primary | ICD-10-CM

## 2023-06-24 LAB
ANION GAP SERPL CALCULATED.3IONS-SCNC: 5 MMOL/L (ref 7–16)
BACTERIA #/AREA URNS HPF: ABNORMAL /HPF
BASOPHILS # BLD AUTO: 0.03 K/UL (ref 0–0.2)
BASOPHILS NFR BLD AUTO: 0.3 % (ref 0–1)
BILIRUB UR QL STRIP: NEGATIVE
BUN SERPL-MCNC: 13 MG/DL (ref 7–18)
BUN/CREAT SERPL: 12 (ref 6–20)
CALCIUM SERPL-MCNC: 8.3 MG/DL (ref 8.5–10.1)
CHLORIDE SERPL-SCNC: 103 MMOL/L (ref 98–107)
CLARITY UR: CLEAR
CO2 SERPL-SCNC: 33 MMOL/L (ref 21–32)
COLOR UR: ABNORMAL
CREAT SERPL-MCNC: 1.06 MG/DL (ref 0.55–1.02)
DIFFERENTIAL METHOD BLD: ABNORMAL
EGFR (NO RACE VARIABLE) (RUSH/TITUS): 66 ML/MIN/1.73M2
EOSINOPHIL # BLD AUTO: 0.33 K/UL (ref 0–0.5)
EOSINOPHIL NFR BLD AUTO: 3.5 % (ref 1–4)
ERYTHROCYTE [DISTWIDTH] IN BLOOD BY AUTOMATED COUNT: 14.9 % (ref 11.5–14.5)
GLUCOSE SERPL-MCNC: 84 MG/DL (ref 74–106)
GLUCOSE UR STRIP-MCNC: NORMAL MG/DL
HCT VFR BLD AUTO: 38.1 % (ref 38–47)
HGB BLD-MCNC: 12.4 G/DL (ref 12–16)
IMM GRANULOCYTES # BLD AUTO: 0.03 K/UL (ref 0–0.04)
IMM GRANULOCYTES NFR BLD: 0.3 % (ref 0–0.4)
KETONES UR STRIP-SCNC: NEGATIVE MG/DL
LEUKOCYTE ESTERASE UR QL STRIP: NEGATIVE
LYMPHOCYTES # BLD AUTO: 3.73 K/UL (ref 1–4.8)
LYMPHOCYTES NFR BLD AUTO: 39.8 % (ref 27–41)
MCH RBC QN AUTO: 28.9 PG (ref 27–31)
MCHC RBC AUTO-ENTMCNC: 32.5 G/DL (ref 32–36)
MCV RBC AUTO: 88.8 FL (ref 80–96)
MONOCYTES # BLD AUTO: 0.71 K/UL (ref 0–0.8)
MONOCYTES NFR BLD AUTO: 7.6 % (ref 2–6)
MPC BLD CALC-MCNC: 11.1 FL (ref 9.4–12.4)
MUCOUS THREADS #/AREA URNS HPF: ABNORMAL /HPF
NEUTROPHILS # BLD AUTO: 4.55 K/UL (ref 1.8–7.7)
NEUTROPHILS NFR BLD AUTO: 48.5 % (ref 53–65)
NITRITE UR QL STRIP: NEGATIVE
NRBC # BLD AUTO: 0 X10E3/UL
NRBC, AUTO (.00): 0 %
PH UR STRIP: 6 PH UNITS
PLATELET # BLD AUTO: 240 K/UL (ref 150–400)
POTASSIUM SERPL-SCNC: 4.3 MMOL/L (ref 3.5–5.1)
PROT UR QL STRIP: NEGATIVE
RBC # BLD AUTO: 4.29 M/UL (ref 4.2–5.4)
RBC # UR STRIP: ABNORMAL /UL
RBC #/AREA URNS HPF: ABNORMAL /HPF
RENAL EPI CELLS #/AREA URNS LPF: ABNORMAL /LPF
SODIUM SERPL-SCNC: 137 MMOL/L (ref 136–145)
SP GR UR STRIP: 1.02
SQUAMOUS #/AREA URNS LPF: ABNORMAL /LPF
TRANS CELLS #/AREA URNS LPF: ABNORMAL /LPF
TRICHOMONAS #/AREA URNS HPF: ABNORMAL /HPF
UROBILINOGEN UR STRIP-ACNC: NORMAL MG/DL
WBC # BLD AUTO: 9.38 K/UL (ref 4.5–11)
WBC #/AREA URNS HPF: ABNORMAL /HPF
WBC CLUMPS, UA: ABNORMAL /HPF
YEAST #/AREA URNS HPF: ABNORMAL /HPF

## 2023-06-24 PROCEDURE — 99284 PR EMERGENCY DEPT VISIT,LEVEL IV: ICD-10-PCS | Mod: ,,, | Performed by: EMERGENCY MEDICINE

## 2023-06-24 PROCEDURE — 99284 EMERGENCY DEPT VISIT MOD MDM: CPT | Mod: ,,, | Performed by: EMERGENCY MEDICINE

## 2023-06-24 PROCEDURE — 80048 BASIC METABOLIC PNL TOTAL CA: CPT | Performed by: EMERGENCY MEDICINE

## 2023-06-24 PROCEDURE — 99284 EMERGENCY DEPT VISIT MOD MDM: CPT

## 2023-06-24 PROCEDURE — 85025 COMPLETE CBC W/AUTO DIFF WBC: CPT | Performed by: EMERGENCY MEDICINE

## 2023-06-24 PROCEDURE — 81001 URINALYSIS AUTO W/SCOPE: CPT | Performed by: EMERGENCY MEDICINE

## 2023-06-24 RX ORDER — DULOXETIN HYDROCHLORIDE 60 MG/1
60 CAPSULE, DELAYED RELEASE ORAL
COMMUNITY
Start: 2023-06-01

## 2023-06-24 RX ORDER — AMLODIPINE BESYLATE 10 MG/1
10 TABLET ORAL
COMMUNITY
Start: 2023-04-04

## 2023-06-24 RX ORDER — CYPROHEPTADINE HYDROCHLORIDE 4 MG/1
4 TABLET ORAL 2 TIMES DAILY
COMMUNITY
Start: 2023-06-01

## 2023-06-24 RX ORDER — IBUPROFEN 600 MG/1
600 TABLET ORAL EVERY 6 HOURS PRN
Qty: 20 TABLET | Refills: 0 | Status: SHIPPED | OUTPATIENT
Start: 2023-06-24

## 2023-06-24 RX ORDER — DEXTROAMPHETAMINE SACCHARATE, AMPHETAMINE ASPARTATE, DEXTROAMPHETAMINE SULFATE AND AMPHETAMINE SULFATE 5; 5; 5; 5 MG/1; MG/1; MG/1; MG/1
1 TABLET ORAL 2 TIMES DAILY
COMMUNITY
Start: 2023-06-01

## 2023-06-24 RX ORDER — HYDROCHLOROTHIAZIDE 12.5 MG/1
12.5 TABLET ORAL EVERY MORNING
COMMUNITY
Start: 2023-04-04

## 2023-06-24 RX ORDER — CLONAZEPAM 1 MG/1
1 TABLET ORAL
COMMUNITY
Start: 2023-06-01

## 2023-06-24 RX ORDER — CYCLOBENZAPRINE HCL 10 MG
10 TABLET ORAL 3 TIMES DAILY PRN
Qty: 15 TABLET | Refills: 0 | Status: SHIPPED | OUTPATIENT
Start: 2023-06-24 | End: 2023-06-29

## 2023-06-24 NOTE — ED TRIAGE NOTES
PATIENT PRESENTS TO ER WITH COMPLAINT OF 2 MONTH BACK PAIN. REPORTS THAT SHE IGNORED PAIN FOR AWHILE BUT RECENTLY ALMOST DROPPED HER GRANDCHILD AND COMPLAINTOF UPPER AND LOWER BACK PAIN. PAINFUL URINATION AS WELL

## 2023-06-24 NOTE — ED PROVIDER NOTES
Encounter Date: 6/24/2023       History     Chief Complaint   Patient presents with    Back Pain     44 y/o female with lower back pain.  Pain is moderate.  Onset was yesterday evening.  Pain is worse with movement and with palpation.  She is worried that the back pain may indicate that she has kidney problems.  She notes no remitting or exacerbating factors.      Review of patient's allergies indicates:   Allergen Reactions    Latex Swelling    Pcn [penicillins]      Past Medical History:   Diagnosis Date    ADHD     Bipolar 1 disorder     Depression     HTN (hypertension)     Hyperlipidemia      No past surgical history on file.  No family history on file.  Social History     Tobacco Use    Smoking status: Every Day    Smokeless tobacco: Never   Substance Use Topics    Alcohol use: Yes    Drug use: Never     Review of Systems   All other systems reviewed and are negative.    Physical Exam     Initial Vitals [06/24/23 0855]   BP Pulse Resp Temp SpO2   (!) 190/108 75 16 97.9 °F (36.6 °C) 100 %      MAP       --         Physical Exam    Nursing note and vitals reviewed.  Constitutional: She appears well-developed and well-nourished.   HENT:   Head: Normocephalic and atraumatic.   Nose: Nose normal.   Mouth/Throat: Oropharynx is clear and moist.   Eyes: Conjunctivae and EOM are normal. Pupils are equal, round, and reactive to light.   Neck: Neck supple.   Normal range of motion.  Cardiovascular:  Normal rate and regular rhythm.           Pulmonary/Chest: Breath sounds normal.   Abdominal: Abdomen is soft. Bowel sounds are normal.   Musculoskeletal:         General: Tenderness present.      Cervical back: Normal range of motion and neck supple.      Comments: TENDER IN LUMBAR PARASPINOUS MUSCULATURE.       Neurological: She is alert and oriented to person, place, and time. She has normal strength. GCS score is 15. GCS eye subscore is 4. GCS verbal subscore is 5. GCS motor subscore is 6.   Skin: Skin is warm and dry.  Capillary refill takes less than 2 seconds.       Medical Screening Exam   See Full Note    ED Course   Procedures  Labs Reviewed   URINALYSIS, REFLEX TO URINE CULTURE - Abnormal; Notable for the following components:       Result Value    Blood, UA Trace (*)     All other components within normal limits   BASIC METABOLIC PANEL - Abnormal; Notable for the following components:    CO2 33 (*)     Anion Gap 5 (*)     Creatinine 1.06 (*)     Calcium 8.3 (*)     All other components within normal limits   CBC WITH DIFFERENTIAL - Abnormal; Notable for the following components:    RDW 14.9 (*)     Neutrophils % 48.5 (*)     Monocytes % 7.6 (*)     All other components within normal limits   URINALYSIS, MICROSCOPIC - Abnormal; Notable for the following components:    Bacteria, UA Occasional (*)     Squamous Epithelial Cells, UA Occasional (*)     All other components within normal limits   CBC W/ AUTO DIFFERENTIAL    Narrative:     The following orders were created for panel order CBC auto differential.  Procedure                               Abnormality         Status                     ---------                               -----------         ------                     CBC with Differential[[id number]]        Abnormal            Final result                 Please view results for these tests on the individual orders.          Imaging Results    None          Medications - No data to display  Medical Decision Making:   Initial Assessment:   ACUTE LOWER BACK PAIN  Differential Diagnosis:   DDX:  UTI VS MUSCULAR VS OTHER  Clinical Tests:   Lab Tests: Ordered and Reviewed  ED Management:  DX:  LUMBAR STRAIN.  RX:  FLEXERIL AND IBUPROFEN.                         Clinical Impression:   Final diagnoses:  [M54.50] Acute bilateral low back pain without sciatica (Primary)        ED Disposition Condition    Discharge Stable          ED Prescriptions       Medication Sig Dispense Start Date End Date Auth. Provider     cyclobenzaprine (FLEXERIL) 10 MG tablet Take 1 tablet (10 mg total) by mouth 3 (three) times daily as needed for Muscle spasms. 15 tablet 6/24/2023 6/29/2023 Ezio Ambrosio MD    ibuprofen (ADVIL,MOTRIN) 600 MG tablet Take 1 tablet (600 mg total) by mouth every 6 (six) hours as needed for Pain. 20 tablet 6/24/2023 -- Ezio Ambrosio MD          Follow-up Information       Follow up With Specialties Details Why Contact Info    PRIMARY CARE PROVIDER OR THIS DEPARTMENT   As needed              Ezio Ambrosio MD  06/24/23 8787

## 2023-07-20 ENCOUNTER — HOSPITAL ENCOUNTER (OUTPATIENT)
Dept: RADIOLOGY | Facility: HOSPITAL | Age: 43
Discharge: HOME | End: 2023-07-20
Payer: MEDICAID

## 2023-07-20 DIAGNOSIS — M25.572 LEFT ANKLE PAIN: ICD-10-CM

## 2023-07-20 PROCEDURE — 73600 X-RAY EXAM OF ANKLE: CPT | Mod: 26,LT,, | Performed by: RADIOLOGY

## 2023-07-20 PROCEDURE — 73600 XR ANKLE 2 VIEW LEFT: ICD-10-PCS | Mod: 26,LT,, | Performed by: RADIOLOGY

## 2023-07-20 PROCEDURE — 73600 X-RAY EXAM OF ANKLE: CPT | Mod: TC,LT

## 2023-08-21 ENCOUNTER — HOSPITAL ENCOUNTER (OUTPATIENT)
Dept: RADIOLOGY | Facility: HOSPITAL | Age: 43
Discharge: HOME | End: 2023-08-21
Attending: ANESTHESIOLOGY
Payer: MEDICAID

## 2023-08-21 DIAGNOSIS — M25.562 LEFT KNEE PAIN: ICD-10-CM

## 2023-08-21 DIAGNOSIS — M54.50 LUMBAGO: ICD-10-CM

## 2023-08-21 PROCEDURE — 72100 X-RAY EXAM L-S SPINE 2/3 VWS: CPT | Mod: TC

## 2023-08-21 PROCEDURE — 73560 XR KNEE 1 OR 2 VIEW LEFT: ICD-10-PCS | Mod: 26,LT,, | Performed by: STUDENT IN AN ORGANIZED HEALTH CARE EDUCATION/TRAINING PROGRAM

## 2023-08-21 PROCEDURE — 73560 X-RAY EXAM OF KNEE 1 OR 2: CPT | Mod: TC,LT

## 2023-08-21 PROCEDURE — 72100 X-RAY EXAM L-S SPINE 2/3 VWS: CPT | Mod: 26,,, | Performed by: STUDENT IN AN ORGANIZED HEALTH CARE EDUCATION/TRAINING PROGRAM

## 2023-08-21 PROCEDURE — 73560 X-RAY EXAM OF KNEE 1 OR 2: CPT | Mod: 26,LT,, | Performed by: STUDENT IN AN ORGANIZED HEALTH CARE EDUCATION/TRAINING PROGRAM

## 2023-08-21 PROCEDURE — 72100 XR LUMBAR SPINE 2 OR 3 VIEWS: ICD-10-PCS | Mod: 26,,, | Performed by: STUDENT IN AN ORGANIZED HEALTH CARE EDUCATION/TRAINING PROGRAM

## 2023-09-25 RX ORDER — HYDROCODONE BITARTRATE AND ACETAMINOPHEN 7.5; 325 MG/1; MG/1
1 TABLET ORAL DAILY
COMMUNITY

## 2023-09-27 ENCOUNTER — HOSPITAL ENCOUNTER (OUTPATIENT)
Facility: HOSPITAL | Age: 43
Discharge: HOME | End: 2023-09-27
Attending: ANESTHESIOLOGY | Admitting: ANESTHESIOLOGY
Payer: MEDICAID

## 2023-09-27 ENCOUNTER — ANESTHESIA EVENT (OUTPATIENT)
Dept: PAIN MEDICINE | Facility: HOSPITAL | Age: 43
End: 2023-09-27
Payer: MEDICAID

## 2023-09-27 ENCOUNTER — ANESTHESIA (OUTPATIENT)
Dept: PAIN MEDICINE | Facility: HOSPITAL | Age: 43
End: 2023-09-27
Payer: MEDICAID

## 2023-09-27 VITALS
WEIGHT: 215 LBS | BODY MASS INDEX: 34.55 KG/M2 | HEART RATE: 63 BPM | OXYGEN SATURATION: 100 % | DIASTOLIC BLOOD PRESSURE: 95 MMHG | TEMPERATURE: 98 F | RESPIRATION RATE: 17 BRPM | SYSTOLIC BLOOD PRESSURE: 149 MMHG | HEIGHT: 66 IN

## 2023-09-27 DIAGNOSIS — M25.562 LEFT KNEE PAIN: ICD-10-CM

## 2023-09-27 PROCEDURE — D9220A PRA ANESTHESIA: Mod: 23,,, | Performed by: NURSE ANESTHETIST, CERTIFIED REGISTERED

## 2023-09-27 PROCEDURE — 27000284 HC CANNULA NASAL: Performed by: NURSE ANESTHETIST, CERTIFIED REGISTERED

## 2023-09-27 PROCEDURE — 64454 NJX AA&/STRD GNCLR NRV BRNCH: CPT | Performed by: ANESTHESIOLOGY

## 2023-09-27 PROCEDURE — D9220A PRA ANESTHESIA: ICD-10-PCS | Mod: 23,,, | Performed by: NURSE ANESTHETIST, CERTIFIED REGISTERED

## 2023-09-27 PROCEDURE — 63600175 PHARM REV CODE 636 W HCPCS: Performed by: NURSE ANESTHETIST, CERTIFIED REGISTERED

## 2023-09-27 PROCEDURE — 27000716 HC OXISENSOR PROBE, ANY SIZE: Performed by: NURSE ANESTHETIST, CERTIFIED REGISTERED

## 2023-09-27 PROCEDURE — 25000003 PHARM REV CODE 250: Performed by: NURSE ANESTHETIST, CERTIFIED REGISTERED

## 2023-09-27 PROCEDURE — 63600175 PHARM REV CODE 636 W HCPCS: Performed by: ANESTHESIOLOGY

## 2023-09-27 PROCEDURE — 25000003 PHARM REV CODE 250: Performed by: ANESTHESIOLOGY

## 2023-09-27 PROCEDURE — 37000008 HC ANESTHESIA 1ST 15 MINUTES: Performed by: ANESTHESIOLOGY

## 2023-09-27 RX ORDER — LIDOCAINE HYDROCHLORIDE 20 MG/ML
INJECTION, SOLUTION EPIDURAL; INFILTRATION; INTRACAUDAL; PERINEURAL
Status: DISCONTINUED | OUTPATIENT
Start: 2023-09-27 | End: 2023-09-27

## 2023-09-27 RX ORDER — BUPIVACAINE HYDROCHLORIDE 2.5 MG/ML
INJECTION, SOLUTION INFILTRATION; PERINEURAL CODE/TRAUMA/SEDATION MEDICATION
Status: DISCONTINUED | OUTPATIENT
Start: 2023-09-27 | End: 2023-09-27 | Stop reason: HOSPADM

## 2023-09-27 RX ORDER — FENTANYL CITRATE 50 UG/ML
INJECTION, SOLUTION INTRAMUSCULAR; INTRAVENOUS
Status: DISCONTINUED | OUTPATIENT
Start: 2023-09-27 | End: 2023-09-27

## 2023-09-27 RX ORDER — PROPOFOL 10 MG/ML
VIAL (ML) INTRAVENOUS
Status: DISCONTINUED | OUTPATIENT
Start: 2023-09-27 | End: 2023-09-27

## 2023-09-27 RX ORDER — SODIUM CHLORIDE 9 MG/ML
500 INJECTION, SOLUTION INTRAVENOUS CONTINUOUS
Status: DISCONTINUED | OUTPATIENT
Start: 2023-09-27 | End: 2023-09-27 | Stop reason: HOSPADM

## 2023-09-27 RX ADMIN — PROPOFOL 100 MG: 10 INJECTION, EMULSION INTRAVENOUS at 09:09

## 2023-09-27 RX ADMIN — LIDOCAINE HYDROCHLORIDE 100 MG: 20 INJECTION, SOLUTION INTRAVENOUS at 09:09

## 2023-09-27 RX ADMIN — SODIUM CHLORIDE: 9 INJECTION, SOLUTION INTRAVENOUS at 09:09

## 2023-09-27 RX ADMIN — FENTANYL CITRATE 100 MCG: 50 INJECTION INTRAMUSCULAR; INTRAVENOUS at 09:09

## 2023-09-27 NOTE — TRANSFER OF CARE
"Anesthesia Transfer of Care Note    Patient: Tess Capone    Procedure(s) Performed: Procedure(s) (LRB):  BLOCK, NERVE, GENICULAR (Left)    Patient location: Other[facility]    Anesthesia Type: general    Transport from OR: Transported from OR on room air with adequate spontaneous ventilation    Post pain: adequate analgesia    Post assessment: no apparent anesthetic complications    Post vital signs: stable    Level of consciousness: sedated and responds to stimulation    Nausea/Vomiting: no nausea/vomiting    Complications: none    Transfer of care protocol was followedComments: Good SV continue, NAD noted, VSS, RTRN      Last vitals:   Visit Vitals  BP (!) 144/96 (BP Location: Left forearm, Patient Position: Lying)   Pulse 70   Temp 36.6 °C (97.8 °F) (Oral)   Resp 18   Ht 5' 6" (1.676 m)   Wt 97.5 kg (215 lb)   SpO2 98%   BMI 34.70 kg/m²     "

## 2023-09-27 NOTE — OP NOTE
09/27/2023  PREOPERATIVE DIAGNOSIS: 1. Left knee pain                                                      2. Osteoarthritis left knee     POSTOPERATIVE DIAGNOSIS: 1.Left knee pain                                                      2. Osteoarthritis Left knee     PROCEDURE PERFORMED: Left Superiolateral Geniculate Nerve Block, Superiomedial Geniculate Nerve Block, Nerve to Vastus Intermedius  Block     Surgeon: Dr. Dayne Sterling   IVF: 300ml  EBL: 0  Anesthesia:MAC  Complications: None     Summary:     After discussion with the patient, we elected to proceed with the of Left Superiolateral Geniculate Nerve Block, Superiomedial Geniculate Nerve Block. Nerve to Vastus Intermedius Block. Informed consent was obtained and documented. The patient was brought to the procedure room and placed on the exam table in a comfortable supine position. The place for needle placement was obtained by manual palpation with radiographic confirmation. The sterile field was prepared by Chloraprep and sterile drapes. Local anesthesia superficial and deep was provided by local infiltration of Bupivacaine 0.25% (2.5mg/ml) 2cc. A 22gauge 3 1/2 inch  needle was placed overlying the left knee joint and using fluoroscopic guidance the needle was advanced to a bony endpoint on the superiolateral portion of the femoral epicondyle of the knee. A second needle was advanced to a bony endpoint on the superiomedial portion of the femoral epicondyle. A third needle was then placed at Nerve to Vastus Intermedius due to metal being over the  Inferiomedial Geniculate Nerve. Attempted aspiration yielded no blood. Lateral radiology views showed all the needles at 50% depth of the femur and tibia. Bupivacaine 0.25% (2.5mg/ml) 2 milliliters was injected via the cannula slowly. The needles were withdrawn, tips intact.  The patient tolerated the procedure well. After observation the patient was discharged with instructions and follow up and provided contact  information to call regarding any concerning symptoms or questions.     pre pain-7/10  post pain-/10

## 2023-09-27 NOTE — DISCHARGE SUMMARY
Patient underwent  Left Superiolateral Geniculate Nerve Block, Superiomedial Geniculate Nerve Block, Nerve to Vastus Intermedius  Block procedure 09/27/2023. The pt will follow up in clinic. Discharged home. Discharge Dx:.Left knee pain

## 2023-09-27 NOTE — ANESTHESIA POSTPROCEDURE EVALUATION
Anesthesia Post Evaluation    Patient: Tess Capone    Procedure(s) Performed: Procedure(s) (LRB):  BLOCK, NERVE, GENICULAR (Left)    Final Anesthesia Type: general      Patient location: [facility].  Patient participation: Yes- Able to Participate  Level of consciousness: awake and alert  Post-procedure vital signs: reviewed and stable  Pain management: adequate  Airway patency: patent    PONV status at discharge: No PONV  Anesthetic complications: no      Cardiovascular status: blood pressure returned to baseline, hemodynamically stable and stable  Respiratory status: unassisted  Hydration status: euvolemic  Follow-up not needed.  Comments: Pt voices appreciation for care          Vitals Value Taken Time   /91 09/27/23 1004   Temp 97.7 09/27/23 1053   Pulse 58 09/27/23 1004   Resp 17 09/27/23 0950   SpO2 99 % 09/27/23 1004   Vitals shown include unvalidated device data.      Event Time   Out of Recovery 09:55:00         Pain/Leland Score: Leland Score: 10 (9/27/2023  9:55 AM)

## 2023-09-27 NOTE — ANESTHESIA PREPROCEDURE EVALUATION
2023  Tess Capone is a 43 y.o., female.      Pre-op Assessment    I have reviewed the Patient Summary Reports.     I have reviewed the Nursing Notes. I have reviewed the NPO Status.   I have reviewed the Medications.     Review of Systems  Anesthesia Hx:  No problems with previous Anesthesia    Social:  Smoker, Alcohol Use    Cardiovascular:   Hypertension, well controlled hyperlipidemia    Pulmonary:   Asthma mild    Psych:   Psychiatric History depression Bipolar one disorder  ADHD         Physical Exam  General: Well nourished, Cooperative, Alert and Oriented    Airway:  Mallampati: II   Mouth Opening: Normal  TM Distance: Normal  Tongue: Normal  Neck ROM: Normal ROM    Dental:  Intact        Anesthesia Plan  Type of Anesthesia, risks & benefits discussed:    Anesthesia Type: Gen Natural Airway, MAC  Intra-op Monitoring Plan: Standard ASA Monitors  Post Op Pain Control Plan: multimodal analgesia and IV/PO Opioids PRN  Induction:  IV  Informed Consent: Informed consent signed with the Patient and all parties understand the risks and agree with anesthesia plan.  All questions answered. Patient consented to blood products? Yes  ASA Score: 3  Day of Surgery Review of History & Physical: I have interviewed and examined the patient. I have reviewed the patient's H&P dated: There are no significant changes.     Ready For Surgery From Anesthesia Perspective.     .   Past Medical History:   Diagnosis Date    ADHD     Asthma     Bipolar 1 disorder     Depression     HTN (hypertension)     Hyperlipidemia        Past Surgical History:   Procedure Laterality Date     SECTION      KNEE SURGERY         History reviewed. No pertinent family history.    Social History     Socioeconomic History    Marital status: Single   Tobacco Use    Smoking status: Every Day    Smokeless tobacco: Never    Substance and Sexual Activity    Alcohol use: Yes    Drug use: Never       Current Facility-Administered Medications   Medication Dose Route Frequency Provider Last Rate Last Admin    0.9%  NaCl infusion  500 mL Intravenous Continuous Dayne Sterling MD           Review of patient's allergies indicates:   Allergen Reactions    Latex Swelling    Pcn [penicillins]        Patient Active Problem List   Diagnosis    Nontraumatic complete tear of left rotator cuff    Pain and swelling of left shoulder    Decreased range of motion of left shoulder    Weakness of left arm

## 2023-09-27 NOTE — PLAN OF CARE
Plan:  D/c pt via wheelchair at 1010  Informed pt if does not void in 8 hours to go to ER. Notify if redness, drainage, from injection site or fever over next 3-4 days. Rest and drink plenty of fluids for the remainder of the day. No lifting over 5 lbs. For the remainder of the day. Continue regular medications as prescribed. May take pain medications as prescribed.     Pain improved 100%   Pre-procedure pain: 7  Post Procedure pain: 0

## 2023-10-04 ENCOUNTER — HOSPITAL ENCOUNTER (OUTPATIENT)
Dept: RADIOLOGY | Facility: HOSPITAL | Age: 43
Discharge: HOME | End: 2023-10-04
Attending: ANESTHESIOLOGY
Payer: MEDICAID

## 2023-10-04 DIAGNOSIS — M25.561 RIGHT KNEE PAIN: ICD-10-CM

## 2023-10-04 PROCEDURE — 73560 XR KNEE 1 OR 2 VIEW RIGHT: ICD-10-PCS | Mod: 26,RT,, | Performed by: RADIOLOGY

## 2023-10-04 PROCEDURE — 73560 X-RAY EXAM OF KNEE 1 OR 2: CPT | Mod: 26,RT,, | Performed by: RADIOLOGY

## 2023-10-04 PROCEDURE — 73560 X-RAY EXAM OF KNEE 1 OR 2: CPT | Mod: TC,RT

## 2023-12-13 DIAGNOSIS — M54.50 LOW BACK PAIN: Primary | ICD-10-CM

## 2023-12-27 ENCOUNTER — CLINICAL SUPPORT (OUTPATIENT)
Dept: REHABILITATION | Facility: HOSPITAL | Age: 43
End: 2023-12-27
Payer: MEDICAID

## 2023-12-27 DIAGNOSIS — M54.50 LOW BACK PAIN: ICD-10-CM

## 2023-12-27 DIAGNOSIS — M54.42 CHRONIC BILATERAL LOW BACK PAIN WITH BILATERAL SCIATICA: Primary | ICD-10-CM

## 2023-12-27 DIAGNOSIS — M54.41 CHRONIC BILATERAL LOW BACK PAIN WITH BILATERAL SCIATICA: Primary | ICD-10-CM

## 2023-12-27 DIAGNOSIS — R29.898 WEAKNESS OF BACK: ICD-10-CM

## 2023-12-27 DIAGNOSIS — G89.29 CHRONIC BILATERAL LOW BACK PAIN WITH BILATERAL SCIATICA: Primary | ICD-10-CM

## 2023-12-27 PROBLEM — M25.512 PAIN AND SWELLING OF LEFT SHOULDER: Status: RESOLVED | Noted: 2022-08-08 | Resolved: 2023-12-27

## 2023-12-27 PROBLEM — M25.412 PAIN AND SWELLING OF LEFT SHOULDER: Status: RESOLVED | Noted: 2022-08-08 | Resolved: 2023-12-27

## 2023-12-27 PROBLEM — M75.122 NONTRAUMATIC COMPLETE TEAR OF LEFT ROTATOR CUFF: Status: RESOLVED | Noted: 2022-08-08 | Resolved: 2023-12-27

## 2023-12-27 PROBLEM — M25.612 DECREASED RANGE OF MOTION OF LEFT SHOULDER: Status: RESOLVED | Noted: 2022-08-08 | Resolved: 2023-12-27

## 2023-12-27 PROCEDURE — 97110 THERAPEUTIC EXERCISES: CPT

## 2023-12-27 PROCEDURE — 97162 PT EVAL MOD COMPLEX 30 MIN: CPT

## 2023-12-27 NOTE — PROGRESS NOTES
See Plan of Care     Sup Visit performed today with SAMIRA Melchor and SAMIRA Gonzalez.  All goals and treatment plan reviewed. Will work toward completion of all goals set.     First Treatment May Include :     Lumbar Mechanical Traction and Modalities as needed and :         Back Exercises    Bike/NuStep                 Calf Stretch    Hamstring Stretch    Pelvic Tilts    Bridging    Bridging with Abduction    Bridging/Hooklying with Marching    Bridging/Hooklying with Knee Ext    Trunk Rotation Exercise    Trunk Rotation Stretch    Ball - Trunk Rotation    Ball- Knee Extension

## 2023-12-27 NOTE — PLAN OF CARE
[facility]   Physical Therapy Initial Evaluation      Name: Tess [facility] Number: [id number]    Therapy Diagnosis:   Encounter Diagnosis   Name Primary?    Low back pain         Physician: Bibi Nunez FNP    Physician Orders: PT Eval and Treat   Medical Diagnosis from Referral: Low Back Pain   Evaluation Date: 12/27/2023  Authorization Period Expiration: Medicaid Managed   Plan of Care Expiration: 2/23/2024   Progress Note Due: As Needed   Visit # / Visits authorized: 1/ Medicaid Managed    FOTO: 28/100    Precautions: Standard     Time In: 9:10 am   Time Out: 10:05 am   Total Appointment Time (timed & untimed codes): 55 minutes    Subjective     Date of onset: 7/26/2023     History of current condition - Tess reports: She has had back pain since July 26, 2023. She was working at [employer] and having to wash dishes and clean. She bent over to wash dishes and could not straighten back up. They had to call the ambulance. She has been to pain treatment center to address this pain. She wants to try Physical Therapy before trying to get injections in her back. She reports the pain is severe at 9/10 most of the time. She has pain and numbness down bilateral Lower Extremities with Left worse than Right. She states the pain is across the entire low back but she has more difficulty using the Left leg. 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     Falls: None    Imaging: MRI study on 12/7/2023 at [facility] :   Has an MRI already. She will bring the report at the next visit.     Prior Therapy: None for this   Social History:  lives with their spouse  Occupation: Not working   Prior Level of Function: Able to work full time  Current Level of Function: Back pain is severe and she is unable to work at this time. She has pain with prolonged sitting, standing, and walking.    Pain:  Current 9/10, worst 10/10, best 4/10  "  Location: bilateral back with bilateral radiculopathy with numbness, tingling, and burning in feet  Description: Aching, Dull, Tingling, Numb, Sharp, and Shooting  Aggravating Factors: Sitting, Standing, Bending, Walking, Lifting, and Getting out of bed/chair  Easing Factors: relaxation, pain medication, heating pad, and hot bath    Patients goals: "I want to strengthen my back and I need relief from this pain."     Medical History:   Past Medical History:   Diagnosis Date    ADHD     Asthma     Bipolar 1 disorder     Depression     HTN (hypertension)     Hyperlipidemia        Surgical History:   Tess Capone  has a past surgical history that includes Knee surgery;  section; and block, nerve, genicular (Left, 2023).    Medications:   Tess has a current medication list which includes the following prescription(s): amlodipine, bupropion, clonazepam, cyproheptadine, dextroamphetamine-amphetamine, duloxetine, hydrochlorothiazide, hydrocodone-acetaminophen, ibuprofen, and meloxicam.    Allergies:   Review of patient's allergies indicates:   Allergen Reactions    Latex Swelling    Pcn [penicillins]         Objective        Posture :     Standing Lordosis        [x]  Increased   []   Decreased   []  Within Normal Limits  Sitting Lordosis  [x]  Increased   []   Decreased   []  Within Normal Limits   Iliac Crest Height  []  Left/Right Increased      [x] Equal/Level  PSIS Height    []  Left/Right Increased      [x] Equal/Level  Pelvic Rot/Torsion       []   Yes     [x]   No  Scoliosis    []  Yes   Concave Right/Left      [x]  No  Lateral Shift    []   Right     []   Left          [x]  Within Normal Limits    Strength :      Myotome/Muscle :  Left   Right     L1-2    Psoas  3+/5  4+/5    L3       Quadriceps  4+/5  4+/5    L4       Anterior Tibialis  5/5  5/5    L5       EHL   5/5  5/5    S1      Gastocnemius  4/5  4+/5    S2      Hamstrings                      Strength :   Abdominals 3/5  Back " Extensors 3/5   Multifidus 3-/5      Range of Motion :     Back :    Forward Flexion 40   Back Bending 0 (Neutral)   Side Bending Left 35   Side Bending Right 30 more painful   Rotation Left 40 More painful   Rotation Right 45    Hip :   Hamstrings : Tight Bilaterally with Left worse than Right   Piriformis : Tight Bilaterally with Left worse than Right       Special Tests : Left Lower Extremity worse than right with slump test and straight leg raise test    SLR :   Right   +/- <60 Degrees     [x]   yes   []  No  ;   +/-  60-90 Degrees     [x]   Yes    []  No   Left     +/- <60 Degrees      [x]  yes    [] No ;   +/-  60-90 Degrees       [x]   Yes    [] No    Sitting Slump Test :  Left : [x] Positive   []  Negative ;  Right : [x] Positive   []  Negative   Lower Extremity Length :   [] Right/Left Longer     [x]  Within Normal Limits   BRIDGETTE : Left : [x] Positive   []  Negative ;  Right : [] Positive   [x]  Negative         Gait Assessment : Patient is unable to stand fully upright due to back pain. Patient has difficulty advancing the Left Lower Extremity during ambulation due to knee pain and back pain. Patient has decreased stance time and antalgic gait on the Left. Patient has decreased step.stride length and slow marcio.       Dermatome : Patient reports numbness, tingling, and burning in bilateral Lower Extremities all the way to her toes with Left worse than Right      Palpation : Patient is very tender to palpation along the entire lumbar spine. She does have muscle spasms bilaterally.           Limitation/Restriction for FOTO Intake Back Survey    Therapist reviewed FOTO scores for Tess Capone on 12/27/2023.   FOTO documents entered into EPIC - see Media section.    Limitation Score: 72%         Treatment     Total Treatment time (time-based codes) separate from Evaluation: 10 minutes       Tess received the treatments listed below:  THERAPEUTIC EXERCISES to develop ROM and flexibility for 10  minutes including :  Therapist initiated the basic home stretching program to include low back, piriformis, and hamstrings    Patient Education and Home Exercises     Education provided:   - Discussed the findings from the Evaluation, Reviewed the Plan of Care, and Instructed patient on their Home Exercise Program      Written Home Exercises Provided: yes. Exercises were reviewed and Tess was able to demonstrate them prior to the end of the session.  Tess demonstrated good  understanding of the education provided. See EMR under Patient Instructions for exercises provided during therapy sessions.    Assessment     Tess is a 43 y.o. female referred to outpatient Physical Therapy with a medical diagnosis of Low Back Pain with Radiculopathy. Tess reports: She has had back pain since July 26, 2023. She was working at [employer] and having to wash dishes and clean. She bent over to wash dishes and could not straighten back up. They had to call the ambulance. She has been to pain treatment center to address this pain. She wants to try Physical Therapy before trying to get injections in her back. She reports the pain is severe at 9/10 most of the time. She has pain and numbness down bilateral Lower Extremities with Left worse than Right. She states the pain is across the entire low back but she has more difficulty using the Left leg. 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   Patient presents with decreased lumbosacral mobility in all directions. She is most limited with Extension and Right Side bending. Patient has decreased strength in abdominals and lumbar musculature. Patient presents with Lower Extremity weakness with Left weaker than Right. She has tightness in bilateral hamstrings and piriformis with Left worse than Right.    Back pain is severe and she is unable to work at this time. She has pain with prolonged sitting, standing, and  walking.  Patient is unable to stand fully upright due to back pain. Patient has difficulty advancing the Left Lower Extremity during ambulation due to knee pain and back pain. Patient has decreased stance time and antalgic gait on the Left. Patient has decreased step.stride length and slow marcio.   Patient reports numbness, tingling, and burning in bilateral Lower Extremities all the way to her toes with Left worse than Right  Therapist initiated the basic home stretching program to include low back, piriformis, and hamstrings. We will progress the Home Exercise Program to include basic back and Lower Extremity strengthening as she tolerates.   Patient will benefit from skilled Physical Therapy intervention to address all deficits and help patient to return to her prior level of function.        Patient prognosis is Fair.   Patient will benefit from skilled outpatient Physical Therapy to address the deficits stated above and in the chart below, provide patient /family education, and to maximize patientt's level of independence.     Plan of care discussed with patient: Yes  Patient's spiritual, cultural and educational needs considered and patient is agreeable to the plan of care and goals as stated below:     Anticipated Barriers for therapy: DDD, Possible Disc bulging/herniation, possible nerve entrapment.    Medical Necessity is demonstrated by the following  History  Co-morbidities and personal factors that may impact the plan of care [] LOW: no personal factors / co-morbidities  [] MODERATE: 1-2 personal factors / co-morbidities  [x] HIGH: 3+ personal factors / co-morbidities    Moderate / High Support Documentation:   Co-morbidities affecting plan of care: DDD, Possible Disc bulging/herniation, possible nerve entrapment, Right Knee pain due to bone cyst in tibia, and old Left TKA    Personal Factors:   no deficits     Examination  Body Structures and Functions, activity limitations and participation  restrictions that may impact the plan of care [] LOW: addressing 1-2 elements  [x] MODERATE: 3+ elements  [] HIGH: 4+ elements (please support below)    Moderate / High Support Documentation: Bilateral Kne Pain, weak back and abdominal muscles, weakness of Left Lower Extremity      Clinical Presentation [] LOW: stable  [x] MODERATE: Evolving Will likely require additional medical intervention at the completion of Physical Therapy t  [] HIGH: Unstable     Decision Making/ Complexity Score: moderate         Goals:  Short Term Goals: 4 weeks   1. Patient will be Independent with Home Exercise Program   2. Patient will demonstrate with improved Posture and Body Mechanics  3. Patient will increase Lumbosacral Range of Motion by 25%   4. Patient will increase Lower Extremity and Core Strength to grossly 4+/5  5. Patient will decrease complaints of pain with activity to Less than or Equal to 5/10     Long Term Goals: 8 weeks   1. Patient will tolerate 30 minutes of activity with complaints of Pain at Less Than or Equal to 4/10        Plan     Plan of care Certification: 12/27/2023 to 2/23/2024.    Outpatient Physical Therapy 2 times weekly for 8 weeks to include the following interventions: Cervical/Lumbar Traction, Electrical Stimulation to decrease pain and inflammation as able, Manual Therapy, Moist Heat/ Ice, Neuromuscular Re-ed, Patient Education, Therapeutic Activities, and Therapeutic Exercise.     SUJEY LLAMAS, PT, DPT

## 2024-01-03 ENCOUNTER — CLINICAL SUPPORT (OUTPATIENT)
Dept: REHABILITATION | Facility: HOSPITAL | Age: 44
End: 2024-01-03
Payer: MEDICAID

## 2024-01-03 DIAGNOSIS — R29.898 WEAKNESS OF BACK: ICD-10-CM

## 2024-01-03 DIAGNOSIS — G89.29 CHRONIC BILATERAL LOW BACK PAIN WITH BILATERAL SCIATICA: Primary | ICD-10-CM

## 2024-01-03 DIAGNOSIS — M54.42 CHRONIC BILATERAL LOW BACK PAIN WITH BILATERAL SCIATICA: Primary | ICD-10-CM

## 2024-01-03 DIAGNOSIS — M54.41 CHRONIC BILATERAL LOW BACK PAIN WITH BILATERAL SCIATICA: Primary | ICD-10-CM

## 2024-01-03 PROCEDURE — 97112 NEUROMUSCULAR REEDUCATION: CPT | Mod: CQ

## 2024-01-03 PROCEDURE — 97012 MECHANICAL TRACTION THERAPY: CPT | Mod: CQ

## 2024-01-03 PROCEDURE — 97110 THERAPEUTIC EXERCISES: CPT | Mod: CQ

## 2024-01-03 NOTE — PROGRESS NOTES
[facility]   Physical Therapy Treatment Note      Name: Tess [facility] Number: [id number]  Therapy Diagnosis:   Encounter Diagnoses   Name Primary?    Chronic bilateral low back pain with bilateral sciatica Yes    Weakness of back      Physician: Bibi Nunez FNP    Visit Date: 1/3/2024  Physician Orders: PT Eval and Treat   Medical Diagnosis from Referral: Low Back Pain   Evaluation Date: 12/27/2023  Authorization Period Expiration: Medicaid Managed   Plan of Care Expiration: 2/23/2024   Progress Note Due: As Needed   Visit # / Visits authorized: 2/ Medicaid Managed    PTA Visit #: 1/5   FOTO: 28/100     Precautions: Standard      Time In: 11:30 am   Time Out: 12:15 pm   Total Appointment Time (timed & untimed codes): 45 minutes    Subjective     Patient reports: she has pain across her lower back. Pain down the legs comes and goes.   She was compliant with home exercise program.  Response to previous treatment: first visit since evaluation   Functional change: n/a    Pain: 7/10  Location: bilateral back      Objective      Objective Measures updated at progress report unless specified.     Treatment     Tess received the treatments listed below:      therapeutic exercises to develop strength, endurance, ROM, flexibility, posture, and core stabilization for 20 minutes including:    Back Exercises    Bike/NuStep Nustep x 5 minutes     Calf Stretch 3 x 20 second hold    Hamstring Stretch 2 x 20 second hold (B)   Seated ball rollouts  10 x 5 second hold    Seated piriformis stretch  2 x 20 second hold (B)                  Bridging/Hooklying with Knee Ext    Trunk Rotation Exercise X 20   Trunk Rotation Stretch    Ball - Trunk Rotation    Ball- Knee Extension                  manual therapy techniques: - were applied to the: - for - minutes, including:    neuromuscular re-education activities to improve: Coordination, Kinesthetic, Sense, Proprioception, and Posture for  10 minutes. The following activities were included:    Pelvic Tilts X 10    Bridging X 15    Bridging with Abduction    Hooklying with Marching X 10        therapeutic activities to improve functional performance for -  minutes, including:      supervised modalities after being cleared for contradictions: Mechanical Traction:  Tess received intermittent mechanical traction to the lumbar spine at a force of 60 pounds for a total of 10 minutes. Hold time of 60 seconds and rest time for 15 seconds. Patient tolerated treatment well without any adverse effects.      Patient Education and Home Exercises       Education provided:   - education on the basic back mat home exercise program. Handout issued to Patient of the basic back mat home exercise program 1/3/2024.     Written Home Exercises Provided: yes. Exercises were reviewed and Tess was able to demonstrate them prior to the end of the session.  Tess demonstrated good  understanding of the education provided. See Electronic Medical Record under Patient Instructions for exercises provided during therapy sessions    Assessment     Supervisory visit with SUJEY LLAMAS PT, DPT  prior to initial PTA visit.     Today is the patient's first treatment since the Evaluation. Initiated the Plan of Care and instructed patient on proper form for all exercises. Therapist initiated the Basic Back Mat Home Exercise Program today. Verbal and tactile cues provided for proper form for all the exercises. She was able to return demonstration well. Patient was given a written copy of the Home Exercise Program that included pictures and written instructions. We will review these with her again at the next visit to ensure she is able to perform these Independently and correctly. Patient reports that the exercises felt really good and it did not increase her lower back pain. She does have intermittent complaints of radicular BLE pain with numbness into her left foot. Therapist  placed Patient on mechanical lumbar traction today at end of tx and Patient really liked this intervention. Will continue to progress as able.       PMH from evaluation:  Tess is a 43 y.o. female referred to outpatient Physical Therapy with a medical diagnosis of Low Back Pain with Radiculopathy. Tess reports: She has had back pain since July 26, 2023. She was working at [employer] and having to wash dishes and clean. She bent over to wash dishes and could not straighten back up. They had to call the ambulance. She has been to pain treatment center to address this pain. She wants to try Physical Therapy before trying to get injections in her back. She reports the pain is severe at 9/10 most of the time. She has pain and numbness down bilateral Lower Extremities with Left worse than Right. She states the pain is across the entire low back but she has more difficulty using the Left leg. 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   Patient presents with decreased lumbosacral mobility in all directions. She is most limited with Extension and Right Side bending. Patient has decreased strength in abdominals and lumbar musculature. Patient presents with Lower Extremity weakness with Left weaker than Right. She has tightness in bilateral hamstrings and piriformis with Left worse than Right.    Back pain is severe and she is unable to work at this time. She has pain with prolonged sitting, standing, and walking.  Patient is unable to stand fully upright due to back pain. Patient has difficulty advancing the Left Lower Extremity during ambulation due to knee pain and back pain. Patient has decreased stance time and antalgic gait on the Left. Patient has decreased step.stride length and slow marcio.   Patient reports numbness, tingling, and burning in bilateral Lower Extremities all the way to her toes with Left worse than Right  Therapist initiated the basic home  stretching program to include low back, piriformis, and hamstrings. We will progress the Home Exercise Program to include basic back and Lower Extremity strengthening as she tolerates.   Patient will benefit from skilled Physical Therapy intervention to address all deficits and help patient to return to her prior level of function.      Tess Is progressing well towards her goals.   Patient prognosis is Good.     Patient will continue to benefit from skilled outpatient physical therapy to address the deficits listed in the problem list box on initial evaluation, provide pt/family education and to maximize pt's level of independence in the home and community environment.     Patient's spiritual, cultural and educational needs considered and pt agreeable to plan of care and goals.     Anticipated Barriers for therapy: DDD, Possible Disc bulging/herniation, possible nerve entrapment.    Goals:  Short Term Goals: 4 weeks   1. Patient will be Independent with Home Exercise Program   2. Patient will demonstrate with improved Posture and Body Mechanics  3. Patient will increase Lumbosacral Range of Motion by 25%   4. Patient will increase Lower Extremity and Core Strength to grossly 4+/5  5. Patient will decrease complaints of pain with activity to Less than or Equal to 5/10      Long Term Goals: 8 weeks   1. Patient will tolerate 30 minutes of activity with complaints of Pain at Less Than or Equal to 4/10         Plan     Plan of care Certification: 12/27/2023 to 2/23/2024.     Outpatient Physical Therapy 2 times weekly for 8 weeks to include the following interventions: Cervical/Lumbar Traction, Electrical Stimulation to decrease pain and inflammation as able, Manual Therapy, Moist Heat/ Ice, Neuromuscular Re-ed, Patient Education, Therapeutic Activities, and Therapeutic Exercise.     Jameson Burns, PTA    1/3/2024

## 2024-01-09 ENCOUNTER — CLINICAL SUPPORT (OUTPATIENT)
Dept: REHABILITATION | Facility: HOSPITAL | Age: 44
End: 2024-01-09
Payer: MEDICAID

## 2024-01-09 DIAGNOSIS — G89.29 CHRONIC BILATERAL LOW BACK PAIN WITH BILATERAL SCIATICA: Primary | ICD-10-CM

## 2024-01-09 DIAGNOSIS — R29.898 WEAKNESS OF BACK: ICD-10-CM

## 2024-01-09 DIAGNOSIS — M54.41 CHRONIC BILATERAL LOW BACK PAIN WITH BILATERAL SCIATICA: Primary | ICD-10-CM

## 2024-01-09 DIAGNOSIS — M54.42 CHRONIC BILATERAL LOW BACK PAIN WITH BILATERAL SCIATICA: Primary | ICD-10-CM

## 2024-01-09 PROCEDURE — 97110 THERAPEUTIC EXERCISES: CPT | Mod: CQ

## 2024-01-09 PROCEDURE — 97112 NEUROMUSCULAR REEDUCATION: CPT | Mod: CQ

## 2024-01-09 PROCEDURE — 97140 MANUAL THERAPY 1/> REGIONS: CPT | Mod: CQ

## 2024-01-09 NOTE — PROGRESS NOTES
"[facility]   Physical Therapy Treatment Note      Name: Tess Capone  "Analisa"  Clinic Number: [id number]  Physician: Bibi Nunez, CARLOSP    Visit Date: 1/9/2024  Physician Orders: PT Eval and Treat   Medical Diagnosis from Referral: Low Back Pain   Evaluation Date: 12/27/2023  Authorization Period Expiration: Medicaid Managed   Plan of Care Expiration: 2/23/2024   Progress Note Due: As Needed   Visit # / Visits authorized: 2/ Medicaid Managed    PTA Visit #: 1/5   FOTO: 28/100     Precautions: Standard      Time In: 4:08 pm   Time Out: 4:47 pm   Total Appointment Time (timed & untimed codes): 39 minutes    Subjective     Patient reports: pain is okay today; 7/10 across low back  She was compliant with home exercise program.  Response to previous treatment: first visit since evaluation   Functional change: n/a    Pain: 7/10  Location: bilateral back      Objective      Objective Measures updated at progress report unless specified.     Treatment     Tess received the treatments listed below:      therapeutic exercises to develop strength, endurance, ROM, flexibility, posture, and core stabilization for 18 minutes including:    Back Exercises    Bike/NuStep Nustep x 5 minutes     Calf Stretch 4 x 20 second hold    Hamstring Stretch 2 x 20 second hold (B)   Seated ball rollouts  10 x 5 second hold    Seated piriformis stretch  2 x 20 second hold (B)                  Bridging/Hooklying with Knee Ext    Trunk Rotation Exercise  20 x 5 sec hol.d   Trunk Rotation Stretch    Ball - Trunk Rotation    Ball- Knee Extension                  manual therapy techniques: - were applied to the: hips/back  for 10 minutes, including:  Piriformis Stretch (B)  Long Axis Traction (B)    neuromuscular re-education activities to improve: Coordination, Kinesthetic, Sense, Proprioception, and Posture for 10 minutes. The following activities were included:        Bridging X 20    Hooklying with Abduction " Green band 30x   Hooklying with Marching X 20, with PPT, green band       therapeutic activities to improve functional performance for -  minutes, including:      supervised modalities after being cleared for contradictions: Mechanical Traction:  Tess received intermittent mechanical traction to the lumbar spine at a force of 60 pounds for a total of 0 minutes. Hold time of 60 seconds and rest time for 15 seconds. Patient tolerated treatment well without any adverse effects.      Patient Education and Home Exercises       Education provided:   - education on the basic back mat home exercise program. Handout issued to Patient of the basic back mat home exercise program 1/3/2024.     Written Home Exercises Provided: yes. Exercises were reviewed and Tess was able to demonstrate them prior to the end of the session.  Tess demonstrated good  understanding of the education provided. See Electronic Medical Record under Patient Instructions for exercises provided during therapy sessions    Assessment     Pt is doing well just continues pain across low back. Pt has mild tightness in hip flexors,QL, and piriformis. Relief felt in low back with long axis traction. Progressed light strengthening with no complaints of pain. Educated pt to keep up with her HEP. Issued theraband for pt to use at home as well. Will progress as able.       PMH from evaluation:  Tess is a 43 y.o. female referred to outpatient Physical Therapy with a medical diagnosis of Low Back Pain with Radiculopathy. Tess reports: She has had back pain since July 26, 2023. She was working at [employer] and having to wash dishes and clean. She bent over to wash dishes and could not straighten back up. They had to call the ambulance. She has been to pain treatment center to address this pain. She wants to try Physical Therapy before trying to get injections in her back. She reports the pain is severe at 9/10 most of the time. She has pain and numbness  down bilateral Lower Extremities with Left worse than Right. She states the pain is across the entire low back but she has more difficulty using the Left leg. 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   Patient presents with decreased lumbosacral mobility in all directions. She is most limited with Extension and Right Side bending. Patient has decreased strength in abdominals and lumbar musculature. Patient presents with Lower Extremity weakness with Left weaker than Right. She has tightness in bilateral hamstrings and piriformis with Left worse than Right.    Back pain is severe and she is unable to work at this time. She has pain with prolonged sitting, standing, and walking.  Patient is unable to stand fully upright due to back pain. Patient has difficulty advancing the Left Lower Extremity during ambulation due to knee pain and back pain. Patient has decreased stance time and antalgic gait on the Left. Patient has decreased step.stride length and slow marcio.   Patient reports numbness, tingling, and burning in bilateral Lower Extremities all the way to her toes with Left worse than Right  Therapist initiated the basic home stretching program to include low back, piriformis, and hamstrings. We will progress the Home Exercise Program to include basic back and Lower Extremity strengthening as she tolerates.   Patient will benefit from skilled Physical Therapy intervention to address all deficits and help patient to return to her prior level of function.      Tess Is progressing well towards her goals.   Patient prognosis is Good.     Patient will continue to benefit from skilled outpatient physical therapy to address the deficits listed in the problem list box on initial evaluation, provide pt/family education and to maximize pt's level of independence in the home and community environment.     Patient's spiritual, cultural and educational needs  considered and pt agreeable to plan of care and goals.     Anticipated Barriers for therapy: DDD, Possible Disc bulging/herniation, possible nerve entrapment.    Goals:  Short Term Goals: 4 weeks   1. Patient will be Independent with Home Exercise Program   2. Patient will demonstrate with improved Posture and Body Mechanics  3. Patient will increase Lumbosacral Range of Motion by 25%   4. Patient will increase Lower Extremity and Core Strength to grossly 4+/5  5. Patient will decrease complaints of pain with activity to Less than or Equal to 5/10      Long Term Goals: 8 weeks   1. Patient will tolerate 30 minutes of activity with complaints of Pain at Less Than or Equal to 4/10         Plan     Plan of care Certification: 12/27/2023 to 2/23/2024.     Outpatient Physical Therapy 2 times weekly for 8 weeks to include the following interventions: Cervical/Lumbar Traction, Electrical Stimulation to decrease pain and inflammation as able, Manual Therapy, Moist Heat/ Ice, Neuromuscular Re-ed, Patient Education, Therapeutic Activities, and Therapeutic Exercise.     Vinod Dawkins, PTA    1/9/2024

## 2024-01-18 ENCOUNTER — CLINICAL SUPPORT (OUTPATIENT)
Dept: REHABILITATION | Facility: HOSPITAL | Age: 44
End: 2024-01-18
Payer: MEDICAID

## 2024-01-18 DIAGNOSIS — M54.41 CHRONIC BILATERAL LOW BACK PAIN WITH BILATERAL SCIATICA: Primary | ICD-10-CM

## 2024-01-18 DIAGNOSIS — G89.29 CHRONIC BILATERAL LOW BACK PAIN WITH BILATERAL SCIATICA: Primary | ICD-10-CM

## 2024-01-18 DIAGNOSIS — M54.42 CHRONIC BILATERAL LOW BACK PAIN WITH BILATERAL SCIATICA: Primary | ICD-10-CM

## 2024-01-18 DIAGNOSIS — R29.898 WEAKNESS OF BACK: ICD-10-CM

## 2024-01-18 PROCEDURE — 97112 NEUROMUSCULAR REEDUCATION: CPT

## 2024-01-18 PROCEDURE — 97110 THERAPEUTIC EXERCISES: CPT

## 2024-01-18 PROCEDURE — 97012 MECHANICAL TRACTION THERAPY: CPT

## 2024-01-18 NOTE — PROGRESS NOTES
"[facility]   Physical Therapy Treatment Note      Name: Tess Capone  "Analisa"  Clinic Number: [id number]  Physician: Bibi Nunez, CARLOSP    Visit Date: 1/18/2024  Physician Orders: PT Eval and Treat   Medical Diagnosis from Referral: Low Back Pain   Evaluation Date: 12/27/2023  Authorization Period Expiration: Medicaid Managed   Plan of Care Expiration: 2/23/2024   Progress Note Due: As Needed   Visit # / Visits authorized: 4/ Medicaid Managed    PTA Visit #: 1/5   FOTO: 28/100     Precautions: Standard      Time In: 10:45 pm   Time Out: 11:40 pm   Total Appointment Time (timed & untimed codes): 55 minutes    Subjective     Patient reports: pain is worse today and is a 9-10/10  She was compliant with home exercise program.  Response to previous treatment: first visit since evaluation   Functional change: n/a    Pain: 9-10/10  Location: bilateral back      Objective      Objective Measures updated at progress report unless specified.     Treatment     Tess received the treatments listed below:      therapeutic exercises to develop strength, endurance, ROM, flexibility, posture, and core stabilization for 25 minutes including:    Back Exercises    Bike/NuStep Nustep x 5 minutes     Calf Stretch 4 x 20 second hold    Hamstring Stretch 2 x 20 second hold (B)   Seated ball rollouts  10 x 5 second hold    Seated piriformis stretch  2 x 20 second hold (B)                  Bridging/Hooklying with Knee Ext    Trunk Rotation Exercise  20 x 5 sec hol.d   Trunk Rotation Stretch    Ball - Trunk Rotation    Ball- Knee Extension                  manual therapy techniques: - were applied to the: hips/back  for 0 minutes, including:  Piriformis Stretch (B)  Long Axis Traction (B)    neuromuscular re-education activities to improve: Coordination, Kinesthetic, Sense, Proprioception, and Posture for 15 minutes. The following activities were included:        Bridging X 20    Hooklying with " Abduction Green band 30x   Hooklying with Marching X 20, with PPT, green band   Hooklying Knee Extension  X 20 with Dorsiflexion added            therapeutic activities to improve functional performance for -  minutes, including:      supervised modalities after being cleared for contradictions: Mechanical Traction:  Tess received intermittent mechanical traction to the lumbar spine at a force of 60 pounds for a total of 0 minutes. Hold time of 60 seconds and rest time for 15 seconds. Patient tolerated treatment well without any adverse effects.      Patient Education and Home Exercises       Education provided:   - education on the basic back mat home exercise program. Handout issued to Patient of the basic back mat home exercise program 1/3/2024.     Written Home Exercises Provided: yes. Exercises were reviewed and Tess was able to demonstrate them prior to the end of the session.  Tess demonstrated good  understanding of the education provided. See Electronic Medical Record under Patient Instructions for exercises provided during therapy sessions    Assessment     Patient brought a copy of her MRI today. MRI results shows 1 mm L3-L4 disc bulge, narrowing of the foramina, and arthropathy; 2 mm L4-L5 disc bulge, narrowing of the foramina, and arthropathy; and 3 mm L5-S1 disc bulge, posterior annular tear, narrowing of the foramina, and arthropathy. Therapist educated patient today on the results or the MRI and explained what was happening at each level. Patient verbalized her understanding.  She reports her pain level is severe today at 9-10/10. Therapist is having her perform the basic back mat exercises. Therapist added Hooklying Knee Extension with dorsiflexion added for nerve glide. Therapist ended session with Mechanical Lumbar Traction with weight increased to 75 lbs. Patient reported that by the end of the traction her toes were no longer numb. We will continue to work with patient on Core and  Lower Extremity Strengthening and modalities to help reduce compression as able. Sup Visit performed today with SAMIRA Field and SAMIRA Gonzalez.  All goals and treatment plan reviewed. Will work toward completion of all goals set.           PMH from evaluation:  Tess is a 43 y.o. female referred to outpatient Physical Therapy with a medical diagnosis of Low Back Pain with Radiculopathy. Tess reports: She has had back pain since July 26, 2023. She was working at [employer] and having to wash dishes and clean. She bent over to wash dishes and could not straighten back up. They had to call the ambulance. She has been to pain treatment center to address this pain. She wants to try Physical Therapy before trying to get injections in her back. She reports the pain is severe at 9/10 most of the time. She has pain and numbness down bilateral Lower Extremities with Left worse than Right. She states the pain is across the entire low back but she has more difficulty using the Left leg. 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   Patient presents with decreased lumbosacral mobility in all directions. She is most limited with Extension and Right Side bending. Patient has decreased strength in abdominals and lumbar musculature. Patient presents with Lower Extremity weakness with Left weaker than Right. She has tightness in bilateral hamstrings and piriformis with Left worse than Right.    Back pain is severe and she is unable to work at this time. She has pain with prolonged sitting, standing, and walking.  Patient is unable to stand fully upright due to back pain. Patient has difficulty advancing the Left Lower Extremity during ambulation due to knee pain and back pain. Patient has decreased stance time and antalgic gait on the Left. Patient has decreased step.stride length and slow marcio.   Patient reports numbness, tingling, and burning in bilateral  Lower Extremities all the way to her toes with Left worse than Right  Therapist initiated the basic home stretching program to include low back, piriformis, and hamstrings. We will progress the Home Exercise Program to include basic back and Lower Extremity strengthening as she tolerates.   Patient will benefit from skilled Physical Therapy intervention to address all deficits and help patient to return to her prior level of function.      Tess Is progressing well towards her goals.   Patient prognosis is Good.     Patient will continue to benefit from skilled outpatient physical therapy to address the deficits listed in the problem list box on initial evaluation, provide pt/family education and to maximize pt's level of independence in the home and community environment.     Patient's spiritual, cultural and educational needs considered and pt agreeable to plan of care and goals.     Anticipated Barriers for therapy: DDD, Possible Disc bulging/herniation, possible nerve entrapment.    Goals:  Short Term Goals: 4 weeks   1. Patient will be Independent with Home Exercise Program   2. Patient will demonstrate with improved Posture and Body Mechanics  3. Patient will increase Lumbosacral Range of Motion by 25%   4. Patient will increase Lower Extremity and Core Strength to grossly 4+/5  5. Patient will decrease complaints of pain with activity to Less than or Equal to 5/10      Long Term Goals: 8 weeks   1. Patient will tolerate 30 minutes of activity with complaints of Pain at Less Than or Equal to 4/10         Plan     Plan of care Certification: 12/27/2023 to 2/23/2024.     Outpatient Physical Therapy 2 times weekly for 8 weeks to include the following interventions: Cervical/Lumbar Traction, Electrical Stimulation to decrease pain and inflammation as able, Manual Therapy, Moist Heat/ Ice, Neuromuscular Re-ed, Patient Education, Therapeutic Activities, and Therapeutic Exercise.     SUJEY LLAMAS, PT     1/18/2024

## 2024-01-23 ENCOUNTER — CLINICAL SUPPORT (OUTPATIENT)
Dept: REHABILITATION | Facility: HOSPITAL | Age: 44
End: 2024-01-23
Payer: MEDICAID

## 2024-01-23 DIAGNOSIS — M54.42 CHRONIC BILATERAL LOW BACK PAIN WITH BILATERAL SCIATICA: Primary | ICD-10-CM

## 2024-01-23 DIAGNOSIS — G89.29 CHRONIC BILATERAL LOW BACK PAIN WITH BILATERAL SCIATICA: Primary | ICD-10-CM

## 2024-01-23 DIAGNOSIS — R29.898 WEAKNESS OF BACK: ICD-10-CM

## 2024-01-23 DIAGNOSIS — M54.41 CHRONIC BILATERAL LOW BACK PAIN WITH BILATERAL SCIATICA: Primary | ICD-10-CM

## 2024-01-23 PROCEDURE — 97112 NEUROMUSCULAR REEDUCATION: CPT | Mod: CQ

## 2024-01-23 PROCEDURE — 97110 THERAPEUTIC EXERCISES: CPT | Mod: CQ

## 2024-01-23 NOTE — PROGRESS NOTES
"[facility]   Physical Therapy Treatment Note      Name: Tess Capone  "Analisa"  Clinic Number: [id number]  Physician: Bibi Nunez, FNP    Visit Date: 1/23/2024  Physician Orders: PT Eval and Treat   Medical Diagnosis from Referral: Low Back Pain   Evaluation Date: 12/27/2023  Authorization Period Expiration: Medicaid Managed   Plan of Care Expiration: 2/23/2024   Progress Note Due: As Needed   Visit # / Visits authorized: 5 / Medicaid Managed    PTA Visit #: 1/5   FOTO: 28/100     Precautions: Standard      Time In: 10:48 am   Time Out: 11:30 am   Total Appointment Time (timed & untimed codes): 42 minutes    Subjective     Patient reports: pain is a little better today since she got her medicine. She still has pain down the left leg and across her back.   She was compliant with home exercise program.  Response to previous treatment: no change in radicular symptoms down LLE  Functional change: n/a    Pain: 5/10  Location: bilateral back and left leg    Objective      Objective Measures updated at progress report unless specified.     Treatment     Tess received the treatments listed below:      therapeutic exercises to develop strength, endurance, ROM, flexibility, posture, and core stabilization for 25 minutes including:    Back Exercises    Bike/NuStep Nustep x 5 minutes     Calf Stretch 4 x 20 second hold    Hamstring Stretch 2 x 20 second hold (B)   Seated ball rollouts  10 x 5 second hold    Seated piriformis stretch  2 x 20 second hold (B)                  Bridging/Hooklying with Knee Ext    Trunk Rotation Exercise  10 x 5 sec hold    Trunk Rotation Stretch    Ball - Trunk Rotation 10 x 5 second hold    Ball- Knee Extension        DKTC  10 x 5 second hold with yellow swiss ball          manual therapy techniques: - were applied to the: hips/back  for 0 minutes, including:  Piriformis Stretch (B)  Long Axis Traction (B)    neuromuscular re-education activities to improve: " Coordination, Kinesthetic, Sense, Proprioception, and Posture for 15 minutes. The following activities were included:        Bridging X 20    Hooklying with Abduction Green band 20x   Hooklying with Marching X 20, with PPT, green band   Hooklying Knee Extension  X 20 with Dorsiflexion added    Long bridging over yellow swiss ball with PPT X 10 with 5 second hold        therapeutic activities to improve functional performance for -  minutes, including:      supervised modalities after being cleared for contradictions: Mechanical Traction:  Tess received intermittent mechanical traction to the lumbar spine at a force of 60 pounds for a total of 0 minutes. Hold time of 60 seconds and rest time for 15 seconds. Patient tolerated treatment well without any adverse effects.      Patient Education and Home Exercises       Education provided:   - education on the basic back mat home exercise program. Handout issued to Patient of the basic back mat home exercise program 1/3/2024.     Written Home Exercises Provided: yes. Exercises were reviewed and Tess was able to demonstrate them prior to the end of the session.  Tess demonstrated good  understanding of the education provided. See Electronic Medical Record under Patient Instructions for exercises provided during therapy sessions    Assessment     Patient reports her pain is a little better today since she got her medicine. She still has radicular pain down the left leg and across her lower back.  She does get relief from the radicular pain down the LLE and tingling into her foot when she does the piriformis stretch, hamstring stretch, and hook lying sciatic nerve flossing on the LLE. Patient's  MRI results shows 1 mm L3-L4 disc bulge, narrowing of the foramina, and arthropathy; 2 mm L4-L5 disc bulge, narrowing of the foramina, and arthropathy; and 3 mm L5-S1 disc bulge, posterior annular tear, narrowing of the foramina, and arthropathy.  Therapist is having her  perform the basic back mat exercises. Therapist added LTR with swiss ball today and DKTC with swiss ball.  Patient deferred mechanical lumbar traction at conclusion of tx today. Patient reported that by the end of today's session her toes were no longer numb and the pain down her LLE had improved. We will continue to work with patient on Core and Lower Extremity Strengthening and modalities to help reduce compression as able.  Will work toward completion of all goals set.       PMH from evaluation:  Tess is a 43 y.o. female referred to outpatient Physical Therapy with a medical diagnosis of Low Back Pain with Radiculopathy. Tess reports: She has had back pain since July 26, 2023. She was working at [email or web address] and having to wash dishes and clean. She bent over to wash dishes and could not straighten back up. They had to call the ambulance. She has been to pain treatment center to address this pain. She wants to try Physical Therapy before trying to get injections in her back. She reports the pain is severe at 9/10 most of the time. She has pain and numbness down bilateral Lower Extremities with Left worse than Right. She states the pain is across the entire low back but she has more difficulty using the Left leg. 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   Patient presents with decreased lumbosacral mobility in all directions. She is most limited with Extension and Right Side bending. Patient has decreased strength in abdominals and lumbar musculature. Patient presents with Lower Extremity weakness with Left weaker than Right. She has tightness in bilateral hamstrings and piriformis with Left worse than Right.    Back pain is severe and she is unable to work at this time. She has pain with prolonged sitting, standing, and walking.  Patient is unable to stand fully upright due to back pain. Patient has difficulty advancing the Left Lower Extremity during  ambulation due to knee pain and back pain. Patient has decreased stance time and antalgic gait on the Left. Patient has decreased step.stride length and slow marcio.   Patient reports numbness, tingling, and burning in bilateral Lower Extremities all the way to her toes with Left worse than Right  Therapist initiated the basic home stretching program to include low back, piriformis, and hamstrings. We will progress the Home Exercise Program to include basic back and Lower Extremity strengthening as she tolerates.   Patient will benefit from skilled Physical Therapy intervention to address all deficits and help patient to return to her prior level of function.      Tess Is progressing well towards her goals.   Patient prognosis is Good.     Patient will continue to benefit from skilled outpatient physical therapy to address the deficits listed in the problem list box on initial evaluation, provide pt/family education and to maximize pt's level of independence in the home and community environment.     Patient's spiritual, cultural and educational needs considered and pt agreeable to plan of care and goals.     Anticipated Barriers for therapy: DDD, Possible Disc bulging/herniation, possible nerve entrapment.    Goals:  Short Term Goals: 4 weeks   1. Patient will be Independent with Home Exercise Program   2. Patient will demonstrate with improved Posture and Body Mechanics  3. Patient will increase Lumbosacral Range of Motion by 25%   4. Patient will increase Lower Extremity and Core Strength to grossly 4+/5  5. Patient will decrease complaints of pain with activity to Less than or Equal to 5/10      Long Term Goals: 8 weeks   1. Patient will tolerate 30 minutes of activity with complaints of Pain at Less Than or Equal to 4/10         Plan     Plan of care Certification: 12/27/2023 to 2/23/2024.     Outpatient Physical Therapy 2 times weekly for 8 weeks to include the following interventions: Cervical/Lumbar  Traction, Electrical Stimulation to decrease pain and inflammation as able, Manual Therapy, Moist Heat/ Ice, Neuromuscular Re-ed, Patient Education, Therapeutic Activities, and Therapeutic Exercise.     Jameson Fonseca, PTA    1/23/2024

## 2024-01-25 ENCOUNTER — CLINICAL SUPPORT (OUTPATIENT)
Dept: REHABILITATION | Facility: HOSPITAL | Age: 44
End: 2024-01-25
Payer: MEDICAID

## 2024-01-25 DIAGNOSIS — R29.898 WEAKNESS OF BACK: ICD-10-CM

## 2024-01-25 DIAGNOSIS — M54.41 CHRONIC BILATERAL LOW BACK PAIN WITH BILATERAL SCIATICA: Primary | ICD-10-CM

## 2024-01-25 DIAGNOSIS — M54.42 CHRONIC BILATERAL LOW BACK PAIN WITH BILATERAL SCIATICA: Primary | ICD-10-CM

## 2024-01-25 DIAGNOSIS — G89.29 CHRONIC BILATERAL LOW BACK PAIN WITH BILATERAL SCIATICA: Primary | ICD-10-CM

## 2024-01-25 PROCEDURE — 97110 THERAPEUTIC EXERCISES: CPT | Mod: CQ

## 2024-01-25 PROCEDURE — 97112 NEUROMUSCULAR REEDUCATION: CPT | Mod: CQ

## 2024-01-25 PROCEDURE — 97014 ELECTRIC STIMULATION THERAPY: CPT | Mod: CQ

## 2024-01-25 NOTE — PROGRESS NOTES
"[facility]   Physical Therapy Treatment Note      Name: Tess Capone  "Analisa"  Clinic Number: [id number]  Physician: Bibi Nunez, FNP    Visit Date: 1/25/2024  Physician Orders: PT Eval and Treat   Medical Diagnosis from Referral: Low Back Pain   Evaluation Date: 12/27/2023  Authorization Period Expiration: Medicaid Managed   Plan of Care Expiration: 2/23/2024   Progress Note Due: As Needed   Visit # / Visits authorized: 6 / Medicaid Managed    PTA Visit #: 2/5   FOTO: 28/100     Precautions: Standard      Time In: 10:45 am   Time Out: 11:33 am   Total Appointment Time (timed & untimed codes): 48 minutes    Subjective     Patient reports: She still has pain down the left leg and across her back.   She was compliant with home exercise program.  Response to previous treatment: no change in radicular symptoms down LLE  Functional change: n/a    Pain: 5/10  Location: bilateral back and left leg    Objective      Objective Measures updated at progress report unless specified.     Treatment     Tess received the treatments listed below:      therapeutic exercises to develop strength, endurance, ROM, flexibility, posture, and core stabilization for 28 minutes including:    Back Exercises    Bike/NuStep Nustep x 5 minutes     Calf Stretch 4 x 20 second hold    Hamstring Stretch 2 x 20 second hold (B)   Seated ball rollouts  10 x 5 second hold    Seated piriformis stretch  2 x 20 second hold (B)   Cybex back extension  2 plates 2 x 10   Cybex rows - close 3 plates 2 x 10   Cybex rows - wide 3 plates 2 x 10   Cybex bilateral leg press  6 plates 2 x 10      Bridging/Hooklying with Knee Ext    Trunk Rotation Exercise  10 x 5 sec hold NTV   Trunk Rotation Stretch    Ball - Trunk Rotation 10 x 5 second hold NTV    Ball- Knee Extension                  manual therapy techniques: - were applied to the: hips/back  for 0 minutes, including:    Piriformis Stretch (B)  Long Axis Traction " (B)    neuromuscular re-education activities to improve: Coordination, Kinesthetic, Sense, Proprioception, and Posture for 10 minutes. The following activities were included:        Bridging X 20    Hooklying with Abduction Green band 20x   Hooklying with Marching X 10, with PPT, green band   Hooklying Knee Extension  X 20 with Dorsiflexion added  NTV   Long bridging over yellow swiss ball with PPT X 10 with 5 second hold  NTV       therapeutic activities to improve functional performance for -  minutes, including:      supervised modalities after being cleared for contradictions: Mechanical Traction:  Tess received intermittent mechanical traction to the lumbar spine at a force of 60 pounds for a total of 0 minutes. Hold time of 60 seconds and rest time for 15 seconds. Patient tolerated treatment well without any adverse effects.    Patient received the following supervised modalities after being cleared for contradictions: Pre-Mod Electrical Stimulation:  Patient received Pre-Mod Electrical Stimulation for pain control applied to the lower back. Pt received stimulation at a frequency of  Hz for 10 minutes. Patient tolerated treatment well without any adverse effects.  MHP to lower lumbar region in conjunction with PREMOD Estim.     Patient Education and Home Exercises       Education provided:   - education on the basic back mat home exercise program. Handout issued to Patient of the basic back mat home exercise program 1/3/2024.     Written Home Exercises Provided: yes. Exercises were reviewed and Waynehelenrojelio was able to demonstrate them prior to the end of the session.  Tess demonstrated good  understanding of the education provided. See Electronic Medical Record under Patient Instructions for exercises provided during therapy sessions    Assessment     Patient arrived today with continued complaints of radicular pain down the left leg and across her lower back.  She does get relief from the radicular  pain down the LLE and tingling into her foot when she does the piriformis stretch, hamstring stretch, and hook lying sciatic nerve flossing on the LLE. Patient's  MRI results shows 1 mm L3-L4 disc bulge, narrowing of the foramina, and arthropathy; 2 mm L4-L5 disc bulge, narrowing of the foramina, and arthropathy; and 3 mm L5-S1 disc bulge, posterior annular tear, narrowing of the foramina, and arthropathy.  Therapist upgraded Patient's plan today to include several of the Cybex machines to include the Cybex bilateral leg press, cybex rows, and cybex back extension machines. Ended session with PREMOD ESTIM and MHP to lumbar region to decrease p! And spasms at Patient's request. We will continue to work with patient on Core and Lower Extremity Strengthening and modalities to help reduce compression as able.  Will work toward completion of all goals set.     PMH from evaluation:  Tess is a 43 y.o. female referred to outpatient Physical Therapy with a medical diagnosis of Low Back Pain with Radiculopathy. Tess reports: She has had back pain since July 26, 2023. She was working at [employer] and having to wash dishes and clean. She bent over to wash dishes and could not straighten back up. They had to call the ambulance. She has been to pain treatment center to address this pain. She wants to try Physical Therapy before trying to get injections in her back. She reports the pain is severe at 9/10 most of the time. She has pain and numbness down bilateral Lower Extremities with Left worse than Right. She states the pain is across the entire low back but she has more difficulty using the Left leg. 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   Patient presents with decreased lumbosacral mobility in all directions. She is most limited with Extension and Right Side bending. Patient has decreased strength in abdominals and lumbar musculature. Patient presents with Lower  Extremity weakness with Left weaker than Right. She has tightness in bilateral hamstrings and piriformis with Left worse than Right.    Back pain is severe and she is unable to work at this time. She has pain with prolonged sitting, standing, and walking.  Patient is unable to stand fully upright due to back pain. Patient has difficulty advancing the Left Lower Extremity during ambulation due to knee pain and back pain. Patient has decreased stance time and antalgic gait on the Left. Patient has decreased step.stride length and slow marcio.   Patient reports numbness, tingling, and burning in bilateral Lower Extremities all the way to her toes with Left worse than Right  Therapist initiated the basic home stretching program to include low back, piriformis, and hamstrings. We will progress the Home Exercise Program to include basic back and Lower Extremity strengthening as she tolerates.   Patient will benefit from skilled Physical Therapy intervention to address all deficits and help patient to return to her prior level of function.      Tess Is progressing well towards her goals.   Patient prognosis is Good.     Patient will continue to benefit from skilled outpatient physical therapy to address the deficits listed in the problem list box on initial evaluation, provide pt/family education and to maximize pt's level of independence in the home and community environment.     Patient's spiritual, cultural and educational needs considered and pt agreeable to plan of care and goals.     Anticipated Barriers for therapy: DDD, Possible Disc bulging/herniation, possible nerve entrapment.    Goals:  Short Term Goals: 4 weeks   1. Patient will be Independent with Home Exercise Program   2. Patient will demonstrate with improved Posture and Body Mechanics  3. Patient will increase Lumbosacral Range of Motion by 25%   4. Patient will increase Lower Extremity and Core Strength to grossly 4+/5  5. Patient will decrease  complaints of pain with activity to Less than or Equal to 5/10      Long Term Goals: 8 weeks   1. Patient will tolerate 30 minutes of activity with complaints of Pain at Less Than or Equal to 4/10         Plan     Plan of care Certification: 12/27/2023 to 2/23/2024.     Outpatient Physical Therapy 2 times weekly for 8 weeks to include the following interventions: Cervical/Lumbar Traction, Electrical Stimulation to decrease pain and inflammation as able, Manual Therapy, Moist Heat/ Ice, Neuromuscular Re-ed, Patient Education, Therapeutic Activities, and Therapeutic Exercise.     Jameson Fonseca, PTA    1/25/2024

## 2024-01-30 ENCOUNTER — CLINICAL SUPPORT (OUTPATIENT)
Dept: REHABILITATION | Facility: HOSPITAL | Age: 44
End: 2024-01-30
Payer: MEDICAID

## 2024-01-30 DIAGNOSIS — R29.898 WEAKNESS OF BACK: ICD-10-CM

## 2024-01-30 DIAGNOSIS — M54.42 CHRONIC BILATERAL LOW BACK PAIN WITH BILATERAL SCIATICA: Primary | ICD-10-CM

## 2024-01-30 DIAGNOSIS — G89.29 CHRONIC BILATERAL LOW BACK PAIN WITH BILATERAL SCIATICA: Primary | ICD-10-CM

## 2024-01-30 DIAGNOSIS — M54.41 CHRONIC BILATERAL LOW BACK PAIN WITH BILATERAL SCIATICA: Primary | ICD-10-CM

## 2024-01-30 PROCEDURE — 97110 THERAPEUTIC EXERCISES: CPT | Mod: CQ

## 2024-01-30 PROCEDURE — 97112 NEUROMUSCULAR REEDUCATION: CPT | Mod: CQ

## 2024-01-30 PROCEDURE — 97014 ELECTRIC STIMULATION THERAPY: CPT | Mod: CQ

## 2024-01-30 NOTE — PROGRESS NOTES
"[facility]   Physical Therapy Treatment Note      Name: Tess Capone  "Analisa"  Clinic Number: [id number]  Physician: Bibi Nunez, FNP    Visit Date: 1/30/2024  Physician Orders: PT Eval and Treat   Medical Diagnosis from Referral: Low Back Pain   Evaluation Date: 12/27/2023  Authorization Period Expiration: Medicaid Managed   Plan of Care Expiration: 2/23/2024   Progress Note Due: As Needed   Visit # / Visits authorized: 7 / Medicaid Managed    PTA Visit #: 3/5   FOTO: 28/100     Precautions: Standard      Time In: 10:39 am   Time Out: 11:32 am   Total Appointment Time (timed & untimed codes): 53 minutes    Subjective     Patient reports: Her back pain is about a 4/10 today but her right knee is hurting ever since her pain block wore off.     She was compliant with home exercise program.  Response to previous treatment: no change in radicular symptoms down LLE  Functional change: n/a    Pain: 4/10  Location: bilateral back and left leg    Objective      Objective Measures updated at progress report unless specified.     Treatment     Tess received the treatments listed below:      therapeutic exercises to develop strength, endurance, ROM, flexibility, posture, and core stabilization for 33 minutes including:    Back Exercises    Bike/NuStep Bike x 5 minutes     Calf Stretch 4 x 20 second hold    Hamstring Stretch 2 x 20 second hold (B)   Seated ball rollouts  10 x 5 second hold    Seated piriformis stretch  2 x 20 second hold (B)   Cybex back extension  2 plates 3 x 10   Cybex rows - close 3 plates 2 x 10   Cybex rows - wide 3 plates 2 x 10   Cybex bilateral leg press  6 plates 2 x 10      Bridging/Hooklying with Knee Ext    Trunk Rotation Exercise  10 x 5 sec hold NTV   Trunk Rotation Stretch    Ball - Trunk Rotation 10 x 5 second hold NTV    Ball- Knee Extension                  manual therapy techniques: - were applied to the: hips/back  for 0 minutes, " including:    Piriformis Stretch (B)  Long Axis Traction (B)    neuromuscular re-education activities to improve: Coordination, Kinesthetic, Sense, Proprioception, and Posture for 10 minutes. The following activities were included:        Bridging X 20 NTV   Hooklying with Abduction Green band 20x NTV   Hooklying with Marching X 10, with PPT, green band   Hooklying Knee Extension  X 20 with Dorsiflexion added  NTV   Long bridging over yellow swiss ball with PPT X 15 with 5 second hold       therapeutic activities to improve functional performance for -  minutes, including:      supervised modalities after being cleared for contradictions: Mechanical Traction:  Tess received intermittent mechanical traction to the lumbar spine at a force of 60 pounds for a total of 0 minutes. Hold time of 60 seconds and rest time for 15 seconds. Patient tolerated treatment well without any adverse effects.    Patient received the following supervised modalities after being cleared for contradictions: Pre-Mod Electrical Stimulation:  Patient received Pre-Mod Electrical Stimulation for pain control applied to the lower back. Pt received stimulation at a frequency of  Hz for 10 minutes. Patient tolerated treatment well without any adverse effects.  MHP to lower lumbar region in conjunction with PREMOD Estim.     Patient Education and Home Exercises       Education provided:   - education on the basic back mat home exercise program. Handout issued to Patient of the basic back mat home exercise program 1/3/2024.     Written Home Exercises Provided: yes. Exercises were reviewed and Tess was able to demonstrate them prior to the end of the session.  Tess demonstrated good  understanding of the education provided. See Electronic Medical Record under Patient Instructions for exercises provided during therapy sessions    Assessment     Patient arrived today with continued complaints of radicular pain down the left leg and  across her lower back.  She rates her lower lumbar p! 4/10 and also reports her right knee is hurting since her block has worn off that she got from Dr. Sterling back on 9/27. Therapist informed Patient we can ease up on some of the exercises we are doing if her right knee is hurting too much today. Patient was able to tolerate all of the exercises well today including the Cybex machines we had incorporated into her plan at the last session, with fatigue noted and no reports of p! Increase. Patient's  MRI results shows 1 mm L3-L4 disc bulge, narrowing of the foramina, and arthropathy; 2 mm L4-L5 disc bulge, narrowing of the foramina, and arthropathy; and 3 mm L5-S1 disc bulge, posterior annular tear, narrowing of the foramina, and arthropathy.  Ended session with PREMOD ESTIM and MHP to lumbar region to decrease p! And spasms at Patient's request. We will continue to work with patient on Core and Lower Extremity Strengthening and modalities to help reduce compression as able.  Will work toward completion of all goals set.     PMH from evaluation:  Tess is a 43 y.o. female referred to outpatient Physical Therapy with a medical diagnosis of Low Back Pain with Radiculopathy. Tess reports: She has had back pain since July 26, 2023. She was working at [employer] and having to wash dishes and clean. She bent over to wash dishes and could not straighten back up. They had to call the ambulance. She has been to pain treatment center to address this pain. She wants to try Physical Therapy before trying to get injections in her back. She reports the pain is severe at 9/10 most of the time. She has pain and numbness down bilateral Lower Extremities with Left worse than Right. She states the pain is across the entire low back but she has more difficulty using the Left leg. 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   Patient presents with decreased lumbosacral mobility in  all directions. She is most limited with Extension and Right Side bending. Patient has decreased strength in abdominals and lumbar musculature. Patient presents with Lower Extremity weakness with Left weaker than Right. She has tightness in bilateral hamstrings and piriformis with Left worse than Right.    Back pain is severe and she is unable to work at this time. She has pain with prolonged sitting, standing, and walking.  Patient is unable to stand fully upright due to back pain. Patient has difficulty advancing the Left Lower Extremity during ambulation due to knee pain and back pain. Patient has decreased stance time and antalgic gait on the Left. Patient has decreased step.stride length and slow marcio.   Patient reports numbness, tingling, and burning in bilateral Lower Extremities all the way to her toes with Left worse than Right  Therapist initiated the basic home stretching program to include low back, piriformis, and hamstrings. We will progress the Home Exercise Program to include basic back and Lower Extremity strengthening as she tolerates.   Patient will benefit from skilled Physical Therapy intervention to address all deficits and help patient to return to her prior level of function.      Tess Is progressing well towards her goals.   Patient prognosis is Good.     Patient will continue to benefit from skilled outpatient physical therapy to address the deficits listed in the problem list box on initial evaluation, provide pt/family education and to maximize pt's level of independence in the home and community environment.     Patient's spiritual, cultural and educational needs considered and pt agreeable to plan of care and goals.     Anticipated Barriers for therapy: DDD, Possible Disc bulging/herniation, possible nerve entrapment.    Goals:  Short Term Goals: 4 weeks   1. Patient will be Independent with Home Exercise Program   2. Patient will demonstrate with improved Posture and Body  Mechanics  3. Patient will increase Lumbosacral Range of Motion by 25%   4. Patient will increase Lower Extremity and Core Strength to grossly 4+/5  5. Patient will decrease complaints of pain with activity to Less than or Equal to 5/10      Long Term Goals: 8 weeks   1. Patient will tolerate 30 minutes of activity with complaints of Pain at Less Than or Equal to 4/10         Plan     Plan of care Certification: 12/27/2023 to 2/23/2024.     Outpatient Physical Therapy 2 times weekly for 8 weeks to include the following interventions: Cervical/Lumbar Traction, Electrical Stimulation to decrease pain and inflammation as able, Manual Therapy, Moist Heat/ Ice, Neuromuscular Re-ed, Patient Education, Therapeutic Activities, and Therapeutic Exercise.     Jameson Fonseca, PTA    1/30/2024

## 2024-02-01 ENCOUNTER — DOCUMENTATION ONLY (OUTPATIENT)
Dept: REHABILITATION | Facility: HOSPITAL | Age: 44
End: 2024-02-01
Payer: MEDICAID

## 2024-02-08 ENCOUNTER — CLINICAL SUPPORT (OUTPATIENT)
Dept: REHABILITATION | Facility: HOSPITAL | Age: 44
End: 2024-02-08
Payer: MEDICAID

## 2024-02-08 DIAGNOSIS — M54.42 CHRONIC BILATERAL LOW BACK PAIN WITH BILATERAL SCIATICA: Primary | ICD-10-CM

## 2024-02-08 DIAGNOSIS — M54.41 CHRONIC BILATERAL LOW BACK PAIN WITH BILATERAL SCIATICA: Primary | ICD-10-CM

## 2024-02-08 DIAGNOSIS — R29.898 WEAKNESS OF BACK: ICD-10-CM

## 2024-02-08 DIAGNOSIS — G89.29 CHRONIC BILATERAL LOW BACK PAIN WITH BILATERAL SCIATICA: Primary | ICD-10-CM

## 2024-02-08 PROCEDURE — 97112 NEUROMUSCULAR REEDUCATION: CPT | Mod: CQ

## 2024-02-08 PROCEDURE — 97012 MECHANICAL TRACTION THERAPY: CPT | Mod: CQ

## 2024-02-08 PROCEDURE — 97110 THERAPEUTIC EXERCISES: CPT | Mod: CQ

## 2024-02-08 NOTE — PROGRESS NOTES
"[facility]   Physical Therapy Treatment Note      Name: Tess Capone  "Analisa"  Clinic Number: [id number]  Physician: Bibi Nunez, FNP    Visit Date: 2/8/2024  Physician Orders: PT Eval and Treat   Medical Diagnosis from Referral: Low Back Pain   Evaluation Date: 12/27/2023  Authorization Period Expiration: Medicaid Managed   Plan of Care Expiration: 2/23/2024   Progress Note Due: As Needed   Visit # / Visits authorized: 8 / Medicaid Managed    PTA Visit #: 4/5   FOTO: 28/100     Precautions: Standard      Time In: 10:34 am   Time Out: 11:36 am   Total Appointment Time (timed & untimed codes): 62  minutes    Subjective     Patient reports: [facility] [place] on [facility] and got shots in both feet. Reports she has spurs and cysts in them. She arrived with straight cane today. She states she does not want to do the bike today. Her back is still hurting her and her left leg continues to burn and hurt.     She was compliant with home exercise program.  Response to previous treatment: no change in radicular symptoms down LLE  Functional change: n/a    Pain: 8/10  Location: bilateral back and left leg    Objective      Objective Measures updated at progress report unless specified.     Treatment     Tess received the treatments listed below:      therapeutic exercises to develop strength, endurance, ROM, flexibility, posture, and core stabilization for 30 minutes including:    Back Exercises    Calf Stretch 4 x 20 second hold    Hamstring Stretch 2 x 20 second hold (B)   Seated ball rollouts  10 x 5 second hold    Seated piriformis stretch  2 x 20 second hold (B)   Cybex back extension  2 plates 3 x 10   Cybex rows - close 4 plates 2 x 10   Cybex rows - wide 4 plates 2 x 10   Cybex bilateral leg press  6 plates 2 x 10 NTV      Bridging/Hooklying with Knee Ext    Trunk Rotation Exercise  10 x 5 sec hold   Ball- Knee Extension                  manual therapy " techniques: - were applied to the: hips/back  for 0 minutes, including:    Piriformis Stretch (B)  Long Axis Traction (B)    neuromuscular re-education activities to improve: Coordination, Kinesthetic, Sense, Proprioception, and Posture for 10 minutes. The following activities were included:    Bilateral extension with retraction  Green theratubing x 20       Bridging X 20 green band    Hooklying with Abduction Green band 20x NTV   Hooklying with Marching X 10, with PPT, green band       therapeutic activities to improve functional performance for -  minutes, including:      supervised modalities after being cleared for contradictions: Mechanical Traction:  Tess received intermittent mechanical traction to the lumbar spine at a force of 80 pounds for a total of 15 minutes. Hold time of 60 seconds and rest time for 15 seconds. Patient tolerated treatment well without any adverse effects.    Patient received the following supervised modalities after being cleared for contradictions: Pre-Mod Electrical Stimulation:  Patient received Pre-Mod Electrical Stimulation for pain control applied to the lower back. Pt received stimulation at a frequency of  Hz for 0 minutes. Patient tolerated treatment well without any adverse effects.  MHP to lower lumbar region in conjunction with PREMOD Estim.     Patient Education and Home Exercises       Education provided:   - education on the basic back mat home exercise program. Handout issued to Patient of the basic back mat home exercise program 1/3/2024.     Written Home Exercises Provided: yes. Exercises were reviewed and Patriciarojelio was able to demonstrate them prior to the end of the session.  Patriciarojelio demonstrated good  understanding of the education provided. See Electronic Medical Record under Patient Instructions for exercises provided during therapy sessions    Assessment       Patient arrived with SC and reports of having to see MD at [facility] in [facility]  Monday. She reports she got shots in both feet due to having cysts and spurs in them and he advised her to use a cane.  Patient informed Therapist she did not want to do the bike or any of the machines that involve pushing with her lower extremity due to having the shots in her feet this week. She continues to report her lower back is still hurting her and her left leg continues to burn and hurt with no improvement from radicular symptoms. Therapist kept session lighter today to Patient's tolerance while focusing on gentle stretching, core stabilization, and strengthening of the posterior chain. She was able to increase weight on the [employer] row machine today with fatigue noted. Patient reports she is scheduled to see the pain doctor on 2/27 for an injection in her back. Patient's MRI results shows 1 mm L3-L4 disc bulge, narrowing of the foramina, and arthropathy; 2 mm L4-L5 disc bulge, narrowing of the foramina, and arthropathy; and 3 mm L5-S1 disc bulge, posterior annular tear, narrowing of the foramina, and arthropathy.  Ended session with Mechanical Lumbar Traction to further decrease nerve root compression and radicular symptoms. We will continue to work with patient on Core and Lower Extremity Strengthening and modalities to help reduce compression as able. Patient's last scheduled appt is 2/13/2024.       PMH from evaluation:  Tess is a 43 y.o. female referred to outpatient Physical Therapy with a medical diagnosis of Low Back Pain with Radiculopathy. Tess reports: She has had back pain since July 26, 2023. She was working at [employer] and having to wash dishes and clean. She bent over to wash dishes and could not straighten back up. They had to call the ambulance. She has been to pain treatment center to address this pain. She wants to try Physical Therapy before trying to get injections in her back. She reports the pain is severe at 9/10 most of the time. She has pain and numbness down bilateral Lower  Extremities with Left worse than Right. She states the pain is across the entire low back but she has more difficulty using the Left leg. 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   Patient presents with decreased lumbosacral mobility in all directions. She is most limited with Extension and Right Side bending. Patient has decreased strength in abdominals and lumbar musculature. Patient presents with Lower Extremity weakness with Left weaker than Right. She has tightness in bilateral hamstrings and piriformis with Left worse than Right.    Back pain is severe and she is unable to work at this time. She has pain with prolonged sitting, standing, and walking.  Patient is unable to stand fully upright due to back pain. Patient has difficulty advancing the Left Lower Extremity during ambulation due to knee pain and back pain. Patient has decreased stance time and antalgic gait on the Left. Patient has decreased step.stride length and slow marcio.   Patient reports numbness, tingling, and burning in bilateral Lower Extremities all the way to her toes with Left worse than Right  Therapist initiated the basic home stretching program to include low back, piriformis, and hamstrings. We will progress the Home Exercise Program to include basic back and Lower Extremity strengthening as she tolerates.   Patient will benefit from skilled Physical Therapy intervention to address all deficits and help patient to return to her prior level of function.      Tess Is progressing well towards her goals.   Patient prognosis is Good.     Patient will continue to benefit from skilled outpatient physical therapy to address the deficits listed in the problem list box on initial evaluation, provide pt/family education and to maximize pt's level of independence in the home and community environment.     Patient's spiritual, cultural and educational needs considered and pt agreeable  to plan of care and goals.     Anticipated Barriers for therapy: DDD, Possible Disc bulging/herniation, possible nerve entrapment.    Goals:  Short Term Goals: 4 weeks   1. Patient will be Independent with Home Exercise Program   2. Patient will demonstrate with improved Posture and Body Mechanics  3. Patient will increase Lumbosacral Range of Motion by 25%   4. Patient will increase Lower Extremity and Core Strength to grossly 4+/5  5. Patient will decrease complaints of pain with activity to Less than or Equal to 5/10      Long Term Goals: 8 weeks   1. Patient will tolerate 30 minutes of activity with complaints of Pain at Less Than or Equal to 4/10         Plan     Plan of care Certification: 12/27/2023 to 2/23/2024.     Outpatient Physical Therapy 2 times weekly for 8 weeks to include the following interventions: Cervical/Lumbar Traction, Electrical Stimulation to decrease pain and inflammation as able, Manual Therapy, Moist Heat/ Ice, Neuromuscular Re-ed, Patient Education, Therapeutic Activities, and Therapeutic Exercise.     Jameson Fonseca, PTA    2/8/2024

## 2024-02-13 ENCOUNTER — CLINICAL SUPPORT (OUTPATIENT)
Dept: REHABILITATION | Facility: HOSPITAL | Age: 44
End: 2024-02-13
Payer: MEDICAID

## 2024-02-13 DIAGNOSIS — M54.41 CHRONIC BILATERAL LOW BACK PAIN WITH BILATERAL SCIATICA: Primary | ICD-10-CM

## 2024-02-13 DIAGNOSIS — R29.898 WEAKNESS OF BACK: ICD-10-CM

## 2024-02-13 DIAGNOSIS — M54.42 CHRONIC BILATERAL LOW BACK PAIN WITH BILATERAL SCIATICA: Primary | ICD-10-CM

## 2024-02-13 DIAGNOSIS — G89.29 CHRONIC BILATERAL LOW BACK PAIN WITH BILATERAL SCIATICA: Primary | ICD-10-CM

## 2024-02-13 PROCEDURE — 97110 THERAPEUTIC EXERCISES: CPT

## 2024-02-13 PROCEDURE — 97014 ELECTRIC STIMULATION THERAPY: CPT

## 2024-02-13 PROCEDURE — 97112 NEUROMUSCULAR REEDUCATION: CPT

## 2024-02-13 NOTE — PROGRESS NOTES
"[facility]   Physical Therapy Treatment Note      Name: Tess Capone  "Analisa"  Clinic Number: [id number]  Physician: Bibi Nunez, FNP    Visit Date: 2/13/2024  Physician Orders: PT Eval and Treat   Medical Diagnosis from Referral: Low Back Pain   Evaluation Date: 12/27/2023  Authorization Period Expiration: Medicaid Managed   Plan of Care Expiration: 2/23/2024   Progress Note Due: As Needed   Visit # / Visits authorized: 9 / Medicaid Managed    PTA Visit #: 4/5   FOTO: 28/100     Precautions: Standard      Time In: 10:45 am   Time Out: 11:40 am   Total Appointment Time (timed & untimed codes): 55  minutes    Subjective     Patient reports: she slipped in the tub on Saturday and her back is really hurting today.     She was compliant with home exercise program.  Response to previous treatment: no change in radicular symptoms down LLE  Functional change: n/a    Pain: 8-9/10  Location: bilateral back and left leg    Objective      Objective Measures updated at progress report unless specified.     Treatment     Tess received the treatments listed below:      therapeutic exercises to develop strength, endurance, ROM, flexibility, posture, and core stabilization for 35 minutes including:    Reviewed her Home Exercise Program and :     Back Exercises    Calf Stretch 4 x 20 second hold    Hamstring Stretch 2 x 20 second hold (B)   Seated ball rollouts  10 x 5 second hold    Seated piriformis stretch  2 x 20 second hold (B)   Cybex back extension  2 plates 3 x 10   Cybex rows - close 4 plates 2 x 10   Cybex rows - wide 4 plates 2 x 10   Cybex bilateral leg press  6 plates 2 x 10 NTV      Bridging/Hooklying with Knee Ext    Trunk Rotation Exercise  10 x 5 sec hold   Ball- Knee Extension                  manual therapy techniques: - were applied to the: hips/back  for 0 minutes, including:    Piriformis Stretch (B)  Long Axis Traction (B)    neuromuscular re-education activities to " improve: Coordination, Kinesthetic, Sense, Proprioception, and Posture for 10 minutes. The following activities were included:    Bilateral extension with retraction  Green theratubing x 20       Bridging X 20 green band    Hooklying with Abduction Green band 20x NTV   Hooklying with Marching X 10, with PPT, green band       therapeutic activities to improve functional performance for -  minutes, including:      supervised modalities after being cleared for contradictions: Mechanical Traction:  Tess received intermittent mechanical traction to the lumbar spine at a force of 80 pounds for a total of 0 minutes. Hold time of 60 seconds and rest time for 15 seconds. Patient tolerated treatment well without any adverse effects.    Patient received the following supervised modalities after being cleared for contradictions: Pre-Mod Electrical Stimulation:  Patient received Pre-Mod Electrical Stimulation for pain control applied to the lower back. Pt received stimulation at a frequency of  Hz for 15 minutes. Patient tolerated treatment well without any adverse effects.  MHP to lower lumbar region in conjunction with PREMOD Estim.     Patient Education and Home Exercises       Education provided:   - education on the basic back mat home exercise program. Handout issued to Patient of the basic back mat home exercise program 1/3/2024.   This handout was reprinted at time of discharge, 2/13/2024, and reviewed with patient.     Written Home Exercises Provided: yes. Exercises were reviewed and Tess was able to demonstrate them prior to the end of the session.  Tess demonstrated good  understanding of the education provided. See Electronic Medical Record under Patient Instructions for exercises provided during therapy sessions    Assessment       Patient has received Physical Therapy for 8 weeks and 9 visits. She continues to have significant pain in the low back. [facility] in [facility]  2/5/2024 and got shots in both feet. Reports she has spurs and cysts in them. She arrived with straight cane today and continues to have pain in bilateral feet. Patient also slipped getting out of the tub Saturday, 2/10/2024. She landed on her left side. This has increased the pain in her Low Back. She has been using espson salt and hot water in the tub to help with the pain. Pain medication is not helping with the pain. She continues to have burning in the Left Leg. Patient's MRI results shows 1 mm L3-L4 disc bulge, narrowing of the foramina, and arthropathy; 2 mm L4-L5 disc bulge, narrowing of the foramina, and arthropathy; and 3 mm L5-S1 disc bulge, posterior annular tear, narrowing of the foramina, and arthropathy.   Patient reports she is scheduled to see the pain doctor on 2/27 for an injection in her back. She is hopeful this will help with her pain.   Therapist has had patient perform stretching and strengthening exercises. We have tried traction and Electrical stimulation. She did try traction last visit but declines it today due to her increased pain. We did end session today with E stim and Moist heat to the Low Back.   Therapist has established a Home Exercise Program. Therapist reviewed this with patient again today to ensure she has no questions and is able to perform this independently.   Patient will now be discharged from Physical Therapy intervention with max potential being met. Therapy was not able to help decrease patient's pain. Therapist is referring patient back to MD for additional medical intervention as conservative treatment with Physical Therapy has failed to provide any relief from her pain.             PMH from evaluation:  Tess is a 43 y.o. female referred to outpatient Physical Therapy with a medical diagnosis of Low Back Pain with Radiculopathy. Tess reports: She has had back pain since July 26, 2023. She was working at [employer] and having to wash dishes and clean. She bent  over to wash dishes and could not straighten back up. They had to call the ambulance. She has been to pain treatment center to address this pain. She wants to try Physical Therapy before trying to get injections in her back. She reports the pain is severe at 9/10 most of the time. She has pain and numbness down bilateral Lower Extremities with Left worse than Right. She states the pain is across the entire low back but she has more difficulty using the Left leg. 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   Patient presents with decreased lumbosacral mobility in all directions. She is most limited with Extension and Right Side bending. Patient has decreased strength in abdominals and lumbar musculature. Patient presents with Lower Extremity weakness with Left weaker than Right. She has tightness in bilateral hamstrings and piriformis with Left worse than Right.    Back pain is severe and she is unable to work at this time. She has pain with prolonged sitting, standing, and walking.  Patient is unable to stand fully upright due to back pain. Patient has difficulty advancing the Left Lower Extremity during ambulation due to knee pain and back pain. Patient has decreased stance time and antalgic gait on the Left. Patient has decreased step.stride length and slow marcio.   Patient reports numbness, tingling, and burning in bilateral Lower Extremities all the way to her toes with Left worse than Right  Therapist initiated the basic home stretching program to include low back, piriformis, and hamstrings. We will progress the Home Exercise Program to include basic back and Lower Extremity strengthening as she tolerates.   Patient will benefit from skilled Physical Therapy intervention to address all deficits and help patient to return to her prior level of function.         Anticipated Barriers for therapy: DDD, Possible Disc bulging/herniation, possible nerve  entrapment.    Goals:  Short Term Goals: 4 weeks   1. Patient will be Independent with Home Exercise Program  - Goal Met   2. Patient will demonstrate with improved Posture and Body Mechanics  - Goal Met   3. Patient will increase Lumbosacral Range of Motion by 25%  - Goal Not Met   4. Patient will increase Lower Extremity and Core Strength to grossly 4+/5   - Goal Not Met   5. Patient will decrease complaints of pain with activity to Less than or Equal to 5/10   - Goal Not Met      Long Term Goals: 8 weeks   1. Patient will tolerate 30 minutes of activity with complaints of Pain at Less Than or Equal to 4/10    - Goal Not Met        Plan     Patient to be Discharged from Physical Therapy services following today's treatment. See Discharge Summary if needed.       SUJEY LLAMAS, PT, DPT    2/13/2024

## 2024-02-13 NOTE — PLAN OF CARE
"[facility]   Physical Therapy Discharge Summary      Name: Tess Capone  "Analisa"  Clinic Number: [id number]  Physician: Bibi Nunez, FNP    Visit Date: 2/13/2024  Physician Orders: PT Eval and Treat   Medical Diagnosis from Referral: Low Back Pain   Evaluation Date: 12/27/2023  Authorization Period Expiration: Medicaid Managed   Plan of Care Expiration: 2/23/2024   Progress Note Due: As Needed   Visit # / Visits authorized: 9 / Medicaid Managed    PTA Visit #: 4/5   FOTO Initial : 28/100  FOTO Discharge : 9/100       Precautions: Standard      Time In: 10:45 am   Time Out: 11:40 am   Total Appointment Time (timed & untimed codes): 55  minutes    Subjective     Patient reports: she slipped in the tub on Saturday and her back is really hurting today.     She was compliant with home exercise program.  Response to previous treatment: no change in radicular symptoms down LLE  Functional change: n/a    Pain: 8-9/10  Location: bilateral back and left leg    Objective      Objective Measures updated at progress report unless specified.     Treatment     Tess received the treatments listed below:      therapeutic exercises to develop strength, endurance, ROM, flexibility, posture, and core stabilization for 35 minutes including:    Reviewed her Home Exercise Program and :     Back Exercises    Calf Stretch 4 x 20 second hold    Hamstring Stretch 2 x 20 second hold (B)   Seated ball rollouts  10 x 5 second hold    Seated piriformis stretch  2 x 20 second hold (B)   Cybex back extension  2 plates 3 x 10   Cybex rows - close 4 plates 2 x 10   Cybex rows - wide 4 plates 2 x 10   Cybex bilateral leg press  6 plates 2 x 10 NTV      Bridging/Hooklying with Knee Ext    Trunk Rotation Exercise  10 x 5 sec hold   Ball- Knee Extension                  manual therapy techniques: - were applied to the: hips/back  for 0 minutes, including:    Piriformis Stretch (B)  Long Axis Traction " (B)    neuromuscular re-education activities to improve: Coordination, Kinesthetic, Sense, Proprioception, and Posture for 10 minutes. The following activities were included:    Bilateral extension with retraction  Green theratubing x 20       Bridging X 20 green band    Hooklying with Abduction Green band 20x NTV   Hooklying with Marching X 10, with PPT, green band       therapeutic activities to improve functional performance for -  minutes, including:      supervised modalities after being cleared for contradictions: Mechanical Traction:  Tess received intermittent mechanical traction to the lumbar spine at a force of 80 pounds for a total of 0 minutes. Hold time of 60 seconds and rest time for 15 seconds. Patient tolerated treatment well without any adverse effects.    Patient received the following supervised modalities after being cleared for contradictions: Pre-Mod Electrical Stimulation:  Patient received Pre-Mod Electrical Stimulation for pain control applied to the lower back. Pt received stimulation at a frequency of  Hz for 15 minutes. Patient tolerated treatment well without any adverse effects.  MHP to lower lumbar region in conjunction with PREMOD Estim.     Patient Education and Home Exercises       Education provided:   - education on the basic back mat home exercise program. Handout issued to Patient of the basic back mat home exercise program 1/3/2024.   This handout was reprinted at time of discharge, 2/13/2024, and reviewed with patient.     Written Home Exercises Provided: yes. Exercises were reviewed and Tess was able to demonstrate them prior to the end of the session.  Tess demonstrated good  understanding of the education provided. See Electronic Medical Record under Patient Instructions for exercises provided during therapy sessions    Assessment       Patient has received Physical Therapy for 8 weeks and 9 visits. She continues to have significant pain in the low back.  Patient [facility] [place] on 2/5/2024 and got shots in both feet. Reports she has spurs and cysts in them. She arrived with straight cane today and continues to have pain in bilateral feet. Patient also slipped getting out of the tub Saturday, 2/10/2024. She landed on her left side. This has increased the pain in her Low Back. She has been using espson salt and hot water in the tub to help with the pain. Pain medication is not helping with the pain. She continues to have burning in the Left Leg. Patient's MRI results shows 1 mm L3-L4 disc bulge, narrowing of the foramina, and arthropathy; 2 mm L4-L5 disc bulge, narrowing of the foramina, and arthropathy; and 3 mm L5-S1 disc bulge, posterior annular tear, narrowing of the foramina, and arthropathy.   Patient reports she is scheduled to see the pain doctor on 2/27 for an injection in her back. She is hopeful this will help with her pain.   Therapist has had patient perform stretching and strengthening exercises. We have tried traction and Electrical stimulation. She did try traction last visit but declines it today due to her increased pain. We did end session today with E stim and Moist heat to the Low Back.   Therapist has established a Home Exercise Program. Therapist reviewed this with patient again today to ensure she has no questions and is able to perform this independently.   Patient will now be discharged from Physical Therapy intervention with max potential being met. Therapy was not able to help decrease patient's pain. Therapist is referring patient back to MD for additional medical intervention as conservative treatment with Physical Therapy has failed to provide any relief from her pain.             PMH from evaluation:  Tess is a 43 y.o. female referred to outpatient Physical Therapy with a medical diagnosis of Low Back Pain with Radiculopathy. Tess reports: She has had back pain since July 26, 2023. She was working at [employer]  and having to wash dishes and clean. She bent over to wash dishes and could not straighten back up. They had to call the ambulance. She has been to pain treatment center to address this pain. She wants to try Physical Therapy before trying to get injections in her back. She reports the pain is severe at 9/10 most of the time. She has pain and numbness down bilateral Lower Extremities with Left worse than Right. She states the pain is across the entire low back but she has more difficulty using the Left leg. 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   Patient presents with decreased lumbosacral mobility in all directions. She is most limited with Extension and Right Side bending. Patient has decreased strength in abdominals and lumbar musculature. Patient presents with Lower Extremity weakness with Left weaker than Right. She has tightness in bilateral hamstrings and piriformis with Left worse than Right.    Back pain is severe and she is unable to work at this time. She has pain with prolonged sitting, standing, and walking.  Patient is unable to stand fully upright due to back pain. Patient has difficulty advancing the Left Lower Extremity during ambulation due to knee pain and back pain. Patient has decreased stance time and antalgic gait on the Left. Patient has decreased step.stride length and slow marcio.   Patient reports numbness, tingling, and burning in bilateral Lower Extremities all the way to her toes with Left worse than Right  Therapist initiated the basic home stretching program to include low back, piriformis, and hamstrings. We will progress the Home Exercise Program to include basic back and Lower Extremity strengthening as she tolerates.   Patient will benefit from skilled Physical Therapy intervention to address all deficits and help patient to return to her prior level of function.         Anticipated Barriers for therapy: DDD, Possible  Disc bulging/herniation, possible nerve entrapment.    Goals:  Short Term Goals: 4 weeks   1. Patient will be Independent with Home Exercise Program  - Goal Met   2. Patient will demonstrate with improved Posture and Body Mechanics  - Goal Met   3. Patient will increase Lumbosacral Range of Motion by 25%  - Goal Not Met   4. Patient will increase Lower Extremity and Core Strength to grossly 4+/5   - Goal Not Met   5. Patient will decrease complaints of pain with activity to Less than or Equal to 5/10   - Goal Not Met      Long Term Goals: 8 weeks   1. Patient will tolerate 30 minutes of activity with complaints of Pain at Less Than or Equal to 4/10    - Goal Not Met      Discharge reason: Patient has reached the maximum rehab potential for the present time    Plan   This patient is discharged from Physical Therapy.    Date of Last visit: 2/13/2024  Total Visits Received: 9  Cancelled Visits: 0  No Show Visits: 0    SUJEY H FARHAD, PT, DPT

## 2024-09-16 NOTE — H&P
[facility] - Pain Management  Pain Management  H&P    Patient Name: Tess Capone  MRN: [id number]  Admission Date: 2023  Primary Care Provider: Kiara Primary Doctor    Patient information was obtained from     Subjective:     Principal Problem:PERCY SMITH MD,P.A.  [address] [place] [id number]             RE: Tess Capone  : 1980  Date of Service: 2023  Medication Refill  Existing Patient 1 month follow up      Chief Complaint:  Back pain constant in nature, sharp, tingling; located in the cervical region; aggravated by sitting, standing; relieved by heat, rest, NSAIDS; lasting 10 years; every 10 years; which started 10 years ago. Patient stated there is worsening of back pain since last visit..  New patient seated in room 7 with c/o back pain.  Controlled Substance Prescription Agreement signed and reviewed. Verbalizes understanding. 8-15-23  [place] Prescription Monitoring Program data was reviewed for this patient for the past 12 calendar months to ascertain any current,or past use of scheduled medications.                                                                                    Opioid Risk Tool                                                                                 Alirio each box                           Item Score                        Item Score                                                                              that applies.                               if Female.                          if Male                    1. Family history of substance abuse                  Alcohol  (  )                                      1                                     3                                                                              Illegal Drugs (  )                               2                                     3                                                                              Prescription Drugs (  )                      4                                     4        2. Personal History of substance abuse          Alcohol  (  )                                      3                                      3                                                                             Illegal Drugs (  )                               4                                     4                                                                             Prescription Drugs (  )                     5                                      5     3. Age (Alirio box if 16-45)                                           (  )                                          1                                      1        4. History of Preadolescent Sexual Abuse                   (  )                                         3                                      0        5. Psychological Disease    Attention Deficit disorder  (  )                                         2                                       2                                                             or                                              Obsessive Compulsive                                                           disorder                                                               or                                                                                       Bipolar Schizophrenia                                                              Depression                        (  )                                         1                                        1        Total=2     Total Score Risk Category           Low risk 0-3         Moderate risk 4-7              High risk >8       History of Present Illness:  What part of the body? back  Pain level at best 5; Pain level at worst 8; Pain level at present 7  08/15/2023-History of present illness-Tess Capone  is a  42y/o female who presents for evaluation and management of chronic pain in her lower back and  bilateral knees.   Subjective: Patient presents today for evaluation for pain problems. Pt reports that pain started 10years ago and r/t getting old. After review of records, pts physical exam and discussion with the patient we will plan to  get a left knee xray, set her up for left genicular NB, send her to physical therapy for her back (has done therapy for knee) , Norco 7.5/325  continue flexeril and ibuprofen.      09/13/2023-pt here for a follow up after her new pt appt. We have reviewed her xrays of the lumbar spine and left knee xray in detail with the pt. She reports today that her right knee has been hurting worse and has a difficult time walking. We will continue her meds and get a right knee xray. She will keep appt for her procedure. Left genicular NB   Nursing:  Pain Medication/Dose/Last Taken/# Taken Norco 7.5   Is it helping? Yes  no Home Exercises  no New medical problems or surgeries  no New medications  no New allergies  no New antibiotics, fever, infection  Allergies:  Allergies Reviewed - 09/13/23 3:15:55 PM CST  Penicillins  Latex  Current Medications:  Medications List Reviewed (09/13/23 3:15:51 PM CST)  HYDROcodone-Acetaminophen Oral Tablet 7.5-325 MG (9/13/2023) Take 1 tablet once a day as needed for 30 day(s)  hydrochlorothiazide 12.5 mg tablet (8/15/2023)  clonazePAM Oral Tablet 1 MG (8/15/2023)  buPROPion HCl Oral Tablet 100 MG (8/15/2023)  amLODIPine Besylate Oral Tablet 10 MG (8/15/2023)  Adderall Oral Tablet 20 MG (8/15/2023)  Previous Studies:  X-RAY; Findings  X-Ray Lumbar 4-5 View including Bending ViewsOrder: [id number]  Impression  IMPRESSION:    Mild facet arthropathy throughout the lumbar levels.    Mild disc degeneration at L5-S1.    RESIDENT RADIOLOGIST:        ATTENDING RADIOLOGIST: Uzma Stevens M.D.  Narrative  RADIOLOGIC EXAM: XR L-SPINE MIN 4 VIEWS    DATE AND TIME OF EXAMINATION: 5/3/2018 2:18 PM    CLINICAL HISTORY: back pain  M54.40 - Lumbago with sciatica, unspecified  side;  G89.29 - Other chronic pain      COMPARISON: None.    TECHNIQUE: XR L-SPINE MIN 4 VIEWS    FINDINGS:    Alignment of lumbar vertebrae is normal. Lumbar vertebral bodies are normal in height. Mild narrowing of the L5/S1 disc space. Disc spaces are otherwise well-maintained. Mild sclerosis of articular facets at the lower lumbar levels. No significant abnormal motion detected with flexion and extension. Sacroiliac joints and hip joint spaces are adequately maintained..  Exam End: 05/03/18 14:26    Specimen Collected: 05/03/18 14:38 Last Resulted: 05/03/18 14:40     IMPRESSION:    Hip joint spaces are normal. 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    Healed proximal left tibial osteotomy with no radiographic complication.    Mild genu varus on the right and moderate genu valgus on the left. No leg length discrepancy.        RESIDENT RADIOLOGIST:        ATTENDING RADIOLOGIST: Chris Hayden M.D.  Narrative  RADIOLOGIC EXAM: XR LOWER EXTREMITY JOINT SURVEY (HIP/ANKLE)    DATE AND TIME OF EXAMINATION: 11/14/2017 2:31 PM    CLINICAL HISTORY: left knee pain  M25.562 - Pain in left knee;  G89.29 - Other chronic pain      COMPARISON: 4/3/2014    TECHNIQUE:XR LOWER EXTREMITY JOINT SURVEY (HIP/ANKLE)    FINDINGS:    Hip joint spaces are normal. 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    Healed proximal left tibial osteotomy with no radiographic complication.    Mild genu varus on the right and moderate genu valgus on the left. No leg length discrepancy.  Exam End: 11/14/17 14:43    Specimen Collected: 11/14/17 14:50 Last Resulted: 11/14/17 14:52  [facility]     Received From: [facility]\     Narrative & Impression  EXAMINATION:  XR LUMBAR SPINE 2 OR 3 VIEWS     CLINICAL HISTORY:  Low back pain, unspecified      TECHNIQUE:  XR LUMBAR SPINE 2 OR 3 VIEWS     COMPARISON:  2018     FINDINGS:  No acute fracture.     No malalignment.     Mild multilevel degenerative disc disease. Mild multilevel endplate change. Multilevel facet change and neural foraminal narrowing.     Impression:     Multilevel degenerative change of the lumbar spine.        Electronically signed by: Joaquín Velazquez  Date:    08/21/2023  Time:    14:43     Exam Ended: 08/21/23 14:04 Last Resulted: 08/21/23 14:43        Narrative & Impression  EXAMINATION:  XR KNEE 1 OR 2 VIEW LEFT     CLINICAL HISTORY:  Pain in left knee     TECHNIQUE:  XR KNEE 1 OR 2 VIEW LEFT     COMPARISON:  2018     FINDINGS:  No acute fracture or dislocation.     Left knee hardware is in expected position. No evidence of loosening or failure.     No radiopaque foreign bodies.     Impression:     No acute findings     Left knee hardware is in expected position. No evidence of loosening or failure.        Electronically signed by: Joaquín Velazquez  Date:    08/21/2023  Time:    14:10     Exam Ended: 08/21/23 14:04 Last Resulted: 08/21/23 14:10        Past Medical History:  The patient has a past medical history of  Hypertension, Asthma, High Cholesterol.  Surgical History:  Knee Replacement Total left knee  LEFT KNEE REPLACEMENT/BILAERAL KNEE SCOPE/3 C SECTIONS  Social History:  Smoking Status: Current every day smoker; Last Reviewed: 09/13/2023  Denies alcohol use  Drug Use:  Occupation: disabled  Marital status: Single  Family History:  There is a family history of  Hypertension.  Review of Systems:  General:   Patient admits to   sweats, fatigue, fever, recent change in weight.  Patient denies  chills.  Eyes:  Patient denies  blurred vision, glaucoma.  Ears, Nose and Throat:  Patient denies  hearing loss, ringing in the ears, sinus congestion, difficulty swallowing.  Cardiovascular:  Patient denies  arrhythmia, chest pain, palpitations.  Respiratory:  Patient denies  requiring oxygen,  shortness of breath, cough, wheezing.  Gastrointestinal:  Patient denies  ulcer, heartburn, nausea, vomiting, blood in stool, diarrhea, constipation, hemorrhoids.  Genitourinary:  Patient denies  hematuria, kidney stones, urinary frequency, polyuria, urinary hesitancy, dysuria, burning on urination, prostate problems, menstrual complications, painful intercourse.  Endocrine:  Patient denies  polydipsia, temperature intolerance, thyroid problems, loss of hair from head or body.  Hematologic:  Patient denies  bleeding tendencies, easy bruising tendency, bleeding disorders, bleeding gums.  Musculoskeletal:   Patient admits to   joint pain, joint stiffness, muscle cramps.  Patient denies  fractures, walking aids.  Neurologic  Patient denies  dizziness, seizures, headache, not confused or disoriented, memory lapses or loss, paralysis, difficulty walking.  Psychologic:  Patient denies  anxiety, panic attacks, mood disorder, emotional problems, depression, sleep disturbances, suicidal thoughts, suicide attempt(s).  Skin:  Patient denies  pruritus, jaundice, skin rash.  DEPRESSION SCREENING:  Not at all the patient reports little interest or pleasure in doing things.  Not at all the patient reports feeling down, depressed, or hopeless.  Date Depression Screening Last Done: 09/13/2023  PHQ-2 Score 0; PHQ-9 Score incomplete  Vital Signs:  Weight 203 lbs; Height 5 ft 7 in; BMI 31.8  09/13/2023 3:01 PM (CST)  Respiration Rate 18; Pulse Rate 98 bpm; Blood Pressure 167 / 110 mm/Hg; Pain Level: 5  Physical Examination:  Cranial Nerves II-XII grossly intact.  No apparent distress.  No somnolence or slurred speech.        Right Shoulder     Pain with ROM internal and external rotation  tenderness over subdeltoid bursa     Lumbar spine    patient has pain with flexion and extension lateral rotation    Lumbar facets are tender to palpation    No focal neurologic deficits noted    Bilateral SI joint tenderness         Left  knee:    Left knee replacement with pain after surgery.             Toxicology Report  Toxicology was not performed.  Assessment:  (M54.50) - Low back pain  (M25.561) - Bilateral knee pain  (Z96.652) - History of total left knee replacement (TKR)  (G89.29) - Other chronic pain  (M53.3) - Disorder of sacrum  (M47.817) - Lumbosacral spondylosis without myelopathy  Plan:  Physical Activity Counseling  Nutrition Counseling     Analgesia: Patient reports adequate levels on analgesia.  Activity: Patient encouraged to exercise and continue normal activities.  Adverse Reactions:  No adverse reactions  Aberrant Behavior: No aberrant behavior noted.  appropriate and UDS consistent  Affect: Normal mood.  Adjuncts:        Functional Goals: Patient will have decreased pain with current medications and have increased function/activities.  Progress toward functioning goals: Pt will maintain/and or have increase in function and activity with current meds.  Reason for initiating/continuing opioids: Improve function, reduce pain, reduce use of er/urgent care and minimize organ toxicity from analgesics.  Continue medication doses as currently prescribed:  Other treatment modalities/referrals recommended:  Risks and benefits of test or referrals discussed:  Urine Drug screen ordered:  Contract/agreement signed or updated using lay language.  Follow up interval:  1-3 months  Follow up with Dr. Sterling.     1. The patient has chronic nonmalignant pain with pathology likely contributing to the symptoms and based on the improvement of pain and function in response to opioid medications; lack of serious side effects and limited identifiable aberrant behavior; I believe it is reasonable to prescribe opioid therapy which requires a greater than 72 hours supply of opioid therapy. Patient was educated on the potential dependency issues associated with long-term opioid use; as well as the decreasing efficacy with prolonged use. Patient has  been advised of the risk/benefits and side effect and how to utilize each medication. Patient was informed that and deviation from therapy protocol could lead to discontinuation of opioids. Patient was given the opportunity to begin weaning off opioids. Patient was given and opportunity to ask and have questions answered regarding the continuation of opioid therapy. At the time patient is at goal in terms of the pain treatment plan. Patients medications have been reviewed with patient. We will follow up with patient to review effectiveness of medication, and to discuss any potential side effects. The morphine milliequivalents (MME) have been calculated for this patient. According to the CDC guidelines, patients with an MME less than 50 should be monitored for pain and function frequently. Therefore this patient will be monitored every 1-3 months via office visits,  evaluations, and urinary drug screens.      2. Follow up in 2 weeks after procedure.  Patient may contact office for earlier follow-up should an issue arise.     3. Patient will be scheduled for superior medial, superior lateral, and inferior medial genicular nerve blocks. We discussed the need for two diagnostically sound nerve blocks before proceeding to cooled RF ablation/or traditional RF ablation. A handout of the procedure was provided or webpage for review of procedure was given for informational purposes. Left genicular NB      4. Procedure instructions given to pt who verbalized understanding. Procedure sheet given to the patient after verbally voicing understanding of the sheet concerning blood thinners, NPO status, and importance of a .     5.  In office screening urine drug screen was performed today. The results were reviewed with no illicit drugs detected. The purpose is to ensure compliance with prescribed medications, and to ensure that there are no medications being used outside of those being prescribed. Additionally, these  tests are necessary to demonstrate that illicit substances are not being abused while simultaneously taking prescribed controlled substances.     6. Contract signed.      7. Monitored Anesthesia Care medical necessity authorization request:       Monitor anesthesia request is medically indicated for the scheduled genicualr NB_procedure due to:     - needle phobia and anxiety, placing the patient at risk during the provided service.__x___  - patient has a BMI greater than 45 ____  - patient has severe sleep apnea for which BiPAP and oxygen are needed while sleeping._____  - patient is unable to follow simple commands due to mental state.____  - patient has an ASA class greater than 3 and requires constant presence of an anesthesiologist/CRNA during the procedure.____  - patient has severe problems with muscles and muscle spasticity that makes it hard to lie still. ____  - patient suffers from chronic pain and is unable to function due to diminished ADL's.__x__  - patient is dependent on opioids or sedatives._x___  - Other _x___     8. Right  knee xray        9. Consider SI joint injections            NARCOTIC STATEMENT  Patient is taking the narcotic pain medications as prescribed. Refill is being given because of the benefit to the patient in regards to the pain. Patient has agreed not to abuse of medication and not to take it more than what is prescribed. The nature of the drug including the potential for addiction and dependency and abuse was also discussed with the patient. Patient has developed physical dependency for the narcotic pain medication for his pain relief.  Patient has also developed tolerance to the sedative effect of the narcotic pain medications.  Patient has decided to continue with these medications despite potential for addiction as described by this office.  This was stressed to the patient that it is the patient's responsibility to secure the narcotic medication and in any event of loss for  any reason whatsoever,  there will be no refill before the next due date. Patient also understands that they are not supposed to drive or work on machinery while taking these medications.  Also explained to the patient that in the event of traffic citation, the presence of this drug in  bloodstream may result in DUI.  Patient has been advised not to drink alcohol while taking this medication.  Patient has verbalized understanding of our office policy and has signed a contract with us in this regard.        Compliance Statement:    Documented by Deb Bernabe RN acting as a scribe for Dayne Sterling M.D. The note accurately reflects work and decisions performed by me.         Chief Complaint:      HPI:       Assessment/Plan:         Dayne Sterling MD  Pain Management  [facility]     9/24/decreased safety awareness/decreased sequencing ability/decreased step length/decreased weight-shifting ability

## (undated) DEVICE — SET EXTENSION CLEARLINK 2INJ

## (undated) DEVICE — GLOVE PROTEXIS PI SYN SURG 7.5

## (undated) DEVICE — KIT IV START RUSH

## (undated) DEVICE — CATH IV 20G 1.16 IN AUTOGARD

## (undated) DEVICE — APPLICATOR CHLORAPREP ORN 26ML

## (undated) DEVICE — SOL CONTINU-FLO SET 2 LAV

## (undated) DEVICE — GLOVE PROTEXIS PI SYN SURG 6.5

## (undated) DEVICE — TRAY NERVE BLOCK UNIV 10/CA